# Patient Record
Sex: MALE | Race: WHITE | Employment: OTHER | URBAN - METROPOLITAN AREA
[De-identification: names, ages, dates, MRNs, and addresses within clinical notes are randomized per-mention and may not be internally consistent; named-entity substitution may affect disease eponyms.]

---

## 2017-02-15 ENCOUNTER — OFFICE VISIT (OUTPATIENT)
Dept: CARDIOLOGY CLINIC | Age: 77
End: 2017-02-15

## 2017-02-15 VITALS
WEIGHT: 185.12 LBS | DIASTOLIC BLOOD PRESSURE: 66 MMHG | RESPIRATION RATE: 16 BRPM | HEIGHT: 68 IN | SYSTOLIC BLOOD PRESSURE: 124 MMHG | BODY MASS INDEX: 28.06 KG/M2 | OXYGEN SATURATION: 93 % | HEART RATE: 72 BPM

## 2017-02-15 DIAGNOSIS — E78.2 MIXED HYPERLIPIDEMIA: Chronic | ICD-10-CM

## 2017-02-15 DIAGNOSIS — I10 ESSENTIAL HYPERTENSION: Chronic | ICD-10-CM

## 2017-02-15 DIAGNOSIS — Z95.5 STATUS POST INSERTION OF DRUG-ELUTING STENT INTO RIGHT CORONARY ARTERY FOR CORONARY ARTERY DISEASE: Chronic | ICD-10-CM

## 2017-02-15 DIAGNOSIS — I25.10 CORONARY ARTERY DISEASE DUE TO LIPID RICH PLAQUE: Primary | ICD-10-CM

## 2017-02-15 DIAGNOSIS — I25.83 CORONARY ARTERY DISEASE DUE TO LIPID RICH PLAQUE: Primary | ICD-10-CM

## 2017-02-15 PROCEDURE — 99214 OFFICE O/P EST MOD 30 MIN: CPT | Performed by: INTERNAL MEDICINE

## 2017-03-07 ENCOUNTER — TELEPHONE (OUTPATIENT)
Dept: FAMILY MEDICINE CLINIC | Age: 77
End: 2017-03-07

## 2017-03-21 ENCOUNTER — TELEPHONE (OUTPATIENT)
Dept: CARDIOLOGY CLINIC | Age: 77
End: 2017-03-21

## 2017-03-21 RX ORDER — ATORVASTATIN CALCIUM 80 MG/1
80 TABLET, FILM COATED ORAL NIGHTLY
Qty: 90 TABLET | Refills: 3 | Status: SHIPPED | OUTPATIENT
Start: 2017-03-21 | End: 2018-02-12 | Stop reason: SDUPTHER

## 2017-05-23 ENCOUNTER — TELEPHONE (OUTPATIENT)
Dept: CARDIOLOGY CLINIC | Age: 77
End: 2017-05-23

## 2017-05-26 RX ORDER — CLOPIDOGREL BISULFATE 75 MG/1
TABLET ORAL
Qty: 90 TABLET | Refills: 0 | Status: SHIPPED | OUTPATIENT
Start: 2017-05-26 | End: 2017-07-31 | Stop reason: SDUPTHER

## 2017-08-02 RX ORDER — CLOPIDOGREL BISULFATE 75 MG/1
TABLET ORAL
Qty: 90 TABLET | Refills: 2 | Status: SHIPPED | OUTPATIENT
Start: 2017-08-02 | End: 2018-02-12 | Stop reason: SDUPTHER

## 2017-09-28 RX ORDER — LISINOPRIL 10 MG/1
TABLET ORAL
Qty: 90 TABLET | Refills: 3 | Status: SHIPPED | OUTPATIENT
Start: 2017-09-28 | End: 2018-06-26 | Stop reason: SDUPTHER

## 2018-02-13 RX ORDER — CLOPIDOGREL BISULFATE 75 MG/1
TABLET ORAL
Qty: 90 TABLET | Refills: 2 | Status: SHIPPED | OUTPATIENT
Start: 2018-02-13 | End: 2018-08-22 | Stop reason: SDUPTHER

## 2018-02-13 RX ORDER — ATORVASTATIN CALCIUM 80 MG/1
TABLET, FILM COATED ORAL
Qty: 90 TABLET | Refills: 3 | Status: SHIPPED | OUTPATIENT
Start: 2018-02-13 | End: 2018-08-22 | Stop reason: SDUPTHER

## 2018-02-13 NOTE — PROGRESS NOTES
[address]   Cardiac Followup    Referring Provider:  Prosper Orantes MD     Chief Complaint   Patient presents with    Coronary Artery Disease        History of Present Illness: This 69 y.o. female is today for follow up on his history of CAD. He has a history of CAD s/p PCI with RHETT with RHETT of RCA. He is treated for HTN and hyperlipidemia. Today, he denies exertional chest pain but reports getting fatigued and mildly shortness of breath when walking longer distances. He has low energy overall. He is able to walk 1/4 of mile before he needs to stop and rest.  He exercises, not hard or to point of heavy exertion, and tolerates this well. He also bench presses 80 lb every other day. 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 He does not exercise as much as he should due to work. He forgets to take statin in the evening at times. Skin is very dry with bumps on his legs, arms, and back. Wife is present for the visit. Past Medical History:   has a past medical history of Actinic keratosis; Allergic rhinitis, cause unspecified; Degeneration of lumbar or lumbosacral intervertebral disc; Other abnormal blood chemistry; Routine general medical examination at a health care facility; and Unspecified essential hypertension. Surgical History:   has a past surgical history that includes Appendectomy and other surgical history (12/98). Social History:   reports that he has never smoked. He has never used smokeless tobacco. He reports that he does not drink alcohol or use drugs. Family History:  family history includes Heart Disease in an other family member.      Home Medications:    Current Outpatient Prescriptions:     nitroGLYCERIN (NITROSTAT) 0.4 MG SL tablet, Place 1 tablet under the tongue every 5 minutes as needed for Chest pain, Disp: 25 tablet, Rfl: 3    atorvastatin (LIPITOR) 80 MG tablet, TAKE 1 TABLET NIGHTLY, Disp: 90 tablet, Rfl: 3    clopidogrel (PLAVIX) 75 MG tablet, TAKE 1 TABLET EVERY DAY, Disp: 90 tablet, Rfl: 2    lisinopril (PRINIVIL;ZESTRIL) 10 MG tablet, TAKE 1 TABLET BY MOUTH DAILY, Disp: 90 tablet, Rfl: 3    aspirin EC 81 MG EC tablet, Take 1 tablet by mouth daily, Disp: 30 tablet, Rfl: 11    Loratadine 10 MG CAPS, Take 10 mg by mouth, Disp: , Rfl:        Allergies:  Review of patient's allergies indicates no known allergies. Review of Systems:   A 10 point review of systems is negative except as noted in the history of present illness. Physical Examination:    Vitals:    02/14/18 0935   BP: 130/70   Pulse: 80        Constitutional and General Appearance: NAD   Respiratory:  · Normal excursion and expansion without use of accessory muscles  · Resp Auscultation: Normal breath sounds without dullness  Cardiovascular:  · The apical impulses not displaced  · JVD is normal  · The carotid upstroke is normal in amplitude and contour without delay or bruit  · Normal S1S2, No S3, No Murmur. Regular rate and rhythm  · Peripheral pulses are symmetrical and full  · There is no clubbing, cyanosis of the extremities. · No edema  · Pedal Pulses: 2+ and equal   Abdomen:  · No masses or tenderness  · Liver/Spleen: No Abnormalities Noted  Neurological/Psychiatric:  · Alert and oriented in all spheres  · Moves all extremities well  · Exhibits normal gait balance and coordination  · No abnormalities of mood, affect, memory, mentation, or behavior are noted    Myoview stress test 8/17/16:  Summary       No EKG evidence for ischemia with exercise       Preserved LV systolic function.   LVEF 65%       Myocardial perfusion imaging with no evidence for ischemia with exercise       Excessive motion noted with imaging which may impact diagnostic accuracy.           Cardiac Cath 5/18/16:  Anatomy:   LM-normal  LAD-50% mid, 70% distal at the apex  D1-50% ostial, 70% proximal  Cx-99% mid with left to left collaterals  RCA-70% mid long, 99% distal hazy  RPDA- medication  2. Take Lipitor in am daily to improve compliance  3. Lipids, liver enzymes, BNP, and CBC soon  4. See dermatologist for dry skin  5. RTC in six months    Thank you for allowing me to participate in the care of this individual.    Scribe's attestation: This note was scribed in the presence of Eddie Chapman M.D. by Vishnu Thornton RN    Physician Attestation: The scribe's documentation has been prepared under my direction and personally reviewed by me in its entirety. I confirm that the note above accurately reflects all work, treatment, procedures, and medical decision making performed by me. Nilda Parra M.D., [facility] [place]

## 2018-02-14 ENCOUNTER — OFFICE VISIT (OUTPATIENT)
Dept: CARDIOLOGY CLINIC | Age: 78
End: 2018-02-14

## 2018-02-14 VITALS — SYSTOLIC BLOOD PRESSURE: 130 MMHG | DIASTOLIC BLOOD PRESSURE: 70 MMHG | HEART RATE: 80 BPM

## 2018-02-14 DIAGNOSIS — I10 ESSENTIAL HYPERTENSION: Chronic | ICD-10-CM

## 2018-02-14 DIAGNOSIS — Z95.5 S/P DRUG ELUTING CORONARY STENT PLACEMENT: Chronic | ICD-10-CM

## 2018-02-14 DIAGNOSIS — I25.83 CORONARY ARTERY DISEASE DUE TO LIPID RICH PLAQUE: ICD-10-CM

## 2018-02-14 DIAGNOSIS — E78.2 MIXED HYPERLIPIDEMIA: Primary | Chronic | ICD-10-CM

## 2018-02-14 DIAGNOSIS — I25.10 CORONARY ARTERY DISEASE DUE TO LIPID RICH PLAQUE: ICD-10-CM

## 2018-02-14 PROCEDURE — 1036F TOBACCO NON-USER: CPT | Performed by: INTERNAL MEDICINE

## 2018-02-14 PROCEDURE — G8427 DOCREV CUR MEDS BY ELIG CLIN: HCPCS | Performed by: INTERNAL MEDICINE

## 2018-02-14 PROCEDURE — 4040F PNEUMOC VAC/ADMIN/RCVD: CPT | Performed by: INTERNAL MEDICINE

## 2018-02-14 PROCEDURE — 1123F ACP DISCUSS/DSCN MKR DOCD: CPT | Performed by: INTERNAL MEDICINE

## 2018-02-14 PROCEDURE — G8484 FLU IMMUNIZE NO ADMIN: HCPCS | Performed by: INTERNAL MEDICINE

## 2018-02-14 PROCEDURE — 99214 OFFICE O/P EST MOD 30 MIN: CPT | Performed by: INTERNAL MEDICINE

## 2018-02-14 PROCEDURE — G8598 ASA/ANTIPLAT THER USED: HCPCS | Performed by: INTERNAL MEDICINE

## 2018-02-14 PROCEDURE — G8421 BMI NOT CALCULATED: HCPCS | Performed by: INTERNAL MEDICINE

## 2018-02-14 RX ORDER — NITROGLYCERIN 0.4 MG/1
0.4 TABLET SUBLINGUAL EVERY 5 MIN PRN
Qty: 25 TABLET | Refills: 3 | Status: SHIPPED | OUTPATIENT
Start: 2018-02-14 | End: 2018-08-22 | Stop reason: SDUPTHER

## 2018-02-21 ENCOUNTER — HOSPITAL ENCOUNTER (OUTPATIENT)
Dept: OTHER | Age: 78
Discharge: HOME | End: 2018-02-21
Attending: INTERNAL MEDICINE | Admitting: INTERNAL MEDICINE

## 2018-02-21 ENCOUNTER — TELEPHONE (OUTPATIENT)
Dept: CARDIOLOGY CLINIC | Age: 78
End: 2018-02-21

## 2018-02-21 DIAGNOSIS — E78.2 MIXED HYPERLIPIDEMIA: Chronic | ICD-10-CM

## 2018-02-21 LAB
ALT SERPL-CCNC: 22 U/L (ref 10–40)
ANION GAP SERPL CALCULATED.3IONS-SCNC: 8 MMOL/L (ref 3–16)
AST SERPL-CCNC: 26 U/L (ref 15–37)
BUN BLDV-MCNC: 23 MG/DL (ref 7–20)
CALCIUM SERPL-MCNC: 9.2 MG/DL (ref 8.3–10.6)
CHLORIDE BLD-SCNC: 104 MMOL/L (ref 99–110)
CHOLESTEROL, TOTAL: 137 MG/DL (ref 0–199)
CO2: 30 MMOL/L (ref 21–32)
CREAT SERPL-MCNC: 1.2 MG/DL (ref 0.8–1.3)
GFR AFRICAN AMERICAN: >60
GFR NON-AFRICAN AMERICAN: 59
GLUCOSE BLD-MCNC: 112 MG/DL (ref 70–99)
HCT VFR BLD CALC: 47.8 % (ref 40.5–52.5)
HDLC SERPL-MCNC: 53 MG/DL (ref 40–60)
HEMOGLOBIN: 16 G/DL (ref 13.5–17.5)
LDL CHOLESTEROL CALCULATED: 71 MG/DL
MCH RBC QN AUTO: 32 PG (ref 26–34)
MCHC RBC AUTO-ENTMCNC: 33.6 G/DL (ref 31–36)
MCV RBC AUTO: 95.3 FL (ref 80–100)
PDW BLD-RTO: 14.3 % (ref 12.4–15.4)
PLATELET # BLD: 323 K/UL (ref 135–450)
PMV BLD AUTO: 7.6 FL (ref 5–10.5)
POTASSIUM SERPL-SCNC: 4.8 MMOL/L (ref 3.5–5.1)
RBC # BLD: 5.01 M/UL (ref 4.2–5.9)
SODIUM BLD-SCNC: 142 MMOL/L (ref 136–145)
TRIGL SERPL-MCNC: 67 MG/DL (ref 0–150)
VLDLC SERPL CALC-MCNC: 13 MG/DL
WBC # BLD: 7.1 K/UL (ref 4–11)

## 2018-02-21 NOTE — TELEPHONE ENCOUNTER
Jagdish Paez MD sent to [facility]             Glucose mildly elevated.  Otherwise labs look good. Jagdish Paez MD sent to [facility]             Labs look good     I called pt and gave above messages. He gave a verbal understanding.

## 2018-04-11 ENCOUNTER — OFFICE VISIT (OUTPATIENT)
Dept: FAMILY MEDICINE CLINIC | Age: 78
End: 2018-04-11

## 2018-04-11 ENCOUNTER — TELEPHONE (OUTPATIENT)
Dept: FAMILY MEDICINE CLINIC | Age: 78
End: 2018-04-11

## 2018-04-11 VITALS
SYSTOLIC BLOOD PRESSURE: 110 MMHG | WEIGHT: 188 LBS | HEART RATE: 74 BPM | HEIGHT: 67 IN | BODY MASS INDEX: 29.51 KG/M2 | DIASTOLIC BLOOD PRESSURE: 64 MMHG | OXYGEN SATURATION: 97 %

## 2018-04-11 DIAGNOSIS — Z00.00 ROUTINE GENERAL MEDICAL EXAMINATION AT A HEALTH CARE FACILITY: Primary | ICD-10-CM

## 2018-04-11 PROCEDURE — G0439 PPPS, SUBSEQ VISIT: HCPCS | Performed by: FAMILY MEDICINE

## 2018-04-11 PROCEDURE — G0009 ADMIN PNEUMOCOCCAL VACCINE: HCPCS | Performed by: FAMILY MEDICINE

## 2018-04-11 PROCEDURE — 4040F PNEUMOC VAC/ADMIN/RCVD: CPT | Performed by: FAMILY MEDICINE

## 2018-04-11 PROCEDURE — 90670 PCV13 VACCINE IM: CPT | Performed by: FAMILY MEDICINE

## 2018-04-11 PROCEDURE — G8598 ASA/ANTIPLAT THER USED: HCPCS | Performed by: FAMILY MEDICINE

## 2018-04-11 ASSESSMENT — LIFESTYLE VARIABLES
HOW MANY STANDARD DRINKS CONTAINING ALCOHOL DO YOU HAVE ON A TYPICAL DAY: 0
HOW OFTEN DO YOU HAVE SIX OR MORE DRINKS ON ONE OCCASION: 0
AUDIT-C TOTAL SCORE: 1
HAVE YOU OR SOMEONE ELSE BEEN INJURED AS A RESULT OF YOUR DRINKING: 0
HAS A RELATIVE, FRIEND, DOCTOR, OR ANOTHER HEALTH PROFESSIONAL EXPRESSED CONCERN ABOUT YOUR DRINKING OR SUGGESTED YOU CUT DOWN: 0
HOW OFTEN DURING THE LAST YEAR HAVE YOU NEEDED AN ALCOHOLIC DRINK FIRST THING IN THE MORNING TO GET YOURSELF GOING AFTER A NIGHT OF HEAVY DRINKING: 0
AUDIT TOTAL SCORE: 1
HOW OFTEN DURING THE LAST YEAR HAVE YOU FAILED TO DO WHAT WAS NORMALLY EXPECTED FROM YOU BECAUSE OF DRINKING: 0
HOW OFTEN DURING THE LAST YEAR HAVE YOU HAD A FEELING OF GUILT OR REMORSE AFTER DRINKING: 0
HOW OFTEN DURING THE LAST YEAR HAVE YOU FOUND THAT YOU WERE NOT ABLE TO STOP DRINKING ONCE YOU HAD STARTED: 0
HOW OFTEN DURING THE LAST YEAR HAVE YOU BEEN UNABLE TO REMEMBER WHAT HAPPENED THE NIGHT BEFORE BECAUSE YOU HAD BEEN DRINKING: 0
HOW OFTEN DO YOU HAVE A DRINK CONTAINING ALCOHOL: 1

## 2018-04-11 ASSESSMENT — PATIENT HEALTH QUESTIONNAIRE - PHQ9: SUM OF ALL RESPONSES TO PHQ QUESTIONS 1-9: 0

## 2018-04-11 ASSESSMENT — ANXIETY QUESTIONNAIRES: GAD7 TOTAL SCORE: 0

## 2018-06-26 ENCOUNTER — TELEPHONE (OUTPATIENT)
Dept: ADMINISTRATIVE | Age: 78
End: 2018-06-26

## 2018-06-26 RX ORDER — LISINOPRIL 10 MG/1
TABLET ORAL
Qty: 90 TABLET | Refills: 3 | Status: SHIPPED | OUTPATIENT
Start: 2018-06-26 | End: 2018-08-22 | Stop reason: SDUPTHER

## 2018-06-27 ENCOUNTER — OFFICE VISIT (OUTPATIENT)
Dept: FAMILY MEDICINE CLINIC | Age: 78
End: 2018-06-27

## 2018-06-27 VITALS
HEART RATE: 64 BPM | SYSTOLIC BLOOD PRESSURE: 140 MMHG | BODY MASS INDEX: 29.51 KG/M2 | DIASTOLIC BLOOD PRESSURE: 70 MMHG | WEIGHT: 187 LBS | OXYGEN SATURATION: 96 %

## 2018-06-27 DIAGNOSIS — D49.2 SKIN NEOPLASM: Primary | ICD-10-CM

## 2018-06-27 PROCEDURE — 11100 PR BIOPSY OF SKIN LESION: CPT | Performed by: FAMILY MEDICINE

## 2018-07-03 DIAGNOSIS — C44.519 BASAL CELL CARCINOMA OF UPPER BACK: Primary | ICD-10-CM

## 2018-08-03 ENCOUNTER — OFFICE VISIT (OUTPATIENT)
Dept: DERMATOLOGY | Age: 78
End: 2018-08-03

## 2018-08-03 DIAGNOSIS — C44.519 BCC (BASAL CELL CARCINOMA), TRUNK: Primary | ICD-10-CM

## 2018-08-03 PROCEDURE — 12032 INTMD RPR S/A/T/EXT 2.6-7.5: CPT | Performed by: DERMATOLOGY

## 2018-08-03 PROCEDURE — 11603 EXC TR-EXT MAL+MARG 2.1-3 CM: CPT | Performed by: DERMATOLOGY

## 2018-08-03 NOTE — PROGRESS NOTES
potential risks of bleeding, discomfort, scar, and recurrence. A consent form was signed by the patient. The area was prepped and draped in the normal sterile fashion. Local anesthesia was achieved with 1% lidocaine with epinephrine. An elliptical excision was performed (width including margins = 2.5 cm) down to subcutaneous tissue. The specimen was submitted to pathology in an appropriately labeled specimen bottle. Pinpoint hemostasis was obtained. The wound edges were undermined and approximated in a layered fashion with buried sutures of 3-0 PDS allowing for wound eversion and Dermabond to approximate the superficial dermis and epidermis. The final length of the wound measured 7 cm. Blood loss was minimal and there were no immediate complications. The wound was dressed with a pressure bandage.

## 2018-08-08 ENCOUNTER — TELEPHONE (OUTPATIENT)
Dept: DERMATOLOGY | Age: 78
End: 2018-08-08

## 2018-08-08 LAB — DERMATOLOGY PATHOLOGY REPORT: ABNORMAL

## 2018-08-21 NOTE — PROGRESS NOTES
essential hypertension        Past Surgical History:   Procedure Laterality Date    APPENDECTOMY      OTHER SURGICAL HISTORY  12/98    basal cell CA-; back-removed 12/98        Social History   Substance Use Topics    Smoking status: Never Smoker    Smokeless tobacco: Never Used    Alcohol use No        Family History   Problem Relation Age of Onset    Heart Disease Other        PHYSICAL EXAMINATION:  Vitals:    08/22/18 1257 08/22/18 1302   BP: (!) 144/80 138/80   Site: Left Arm Left Arm   Position: Sitting Sitting   Cuff Size: Medium Adult Medium Adult   Pulse: 72    Weight: 187 lb 6.4 oz (85 kg)    Height: 5' 7.5\" (1.715 m)      Estimated body mass index is 28.92 kg/m² as calculated from the following:    Height as of this encounter: 5' 7.5\" (1.715 m). Weight as of this encounter: 187 lb 6.4 oz (85 kg). General Appearance: No apparent distress  Eyes:  · Conjunctiva clear  · Pupils equal, round, reactive to light  ENT:  · External Ears and Nose unremarkable  · Oral mucosa is moist  Respiratory:  · Normal excursion and expansion without use of accessory muscles  · Resp Auscultation: Normal breath sounds without dullness  Cardiovascular:  · JVD is normal  · The carotid upstroke is normal in amplitude and contour without delay or bruit  · Apical impulse is not displaced  · Normal S1, S2. No S3. No Murmur. Regular rate and rhythm  · No edema  · Pedal Pulses: 2+ and equal   Abdomen:  · No masses or tenderness  · Liver/Spleen: No Abnormalities Noted  Musculoskeletal:  · Fingers without clubbing or cyanosis  · Normal Gait  Skin:  · No rash. Incision with ecchymosis post skin cancer removal on left upper back   · Normal skin turgor   Neurologic/Psychiatric:  · Alert and oriented in all spheres  · Normal mood and affect  · Memory and mentation intact      Myoview stress test 8/17/16:  Summary       No EKG evidence for ischemia with exercise       Preserved LV systolic function.   LVEF 65%       Myocardial perfusion imaging with no evidence for ischemia with exercise       Excessive motion noted with imaging which may impact diagnostic accuracy.             Cardiac Cath 5/18/16:  Anatomy:   LM-normal  LAD-50% mid, 70% distal at the apex  D1-50% ostial, 70% proximal  Cx-99% mid with left to left collaterals  RCA-70% mid long, 99% distal hazy  RPDA- patent  LVEF- 50 % with inferior hypokinesis, no significant mitral regurgitation  PCI: Circumflex 99% to 0% mid with a 2.25 mm x 26 mm Medtronic resolute drug-eluting stent postdilated with a 2.5 mm noncompliant balloon. RCA 70% to 0% mid with 2.5 mm x 30 mm Medtronic resolute eluding stent and a 2.5 mm x 22 mm Medtronic resolute drug-eluting stent. The distal RCA is 99% to 0% with a 2.25 mm x 22 mm Medtronic resolute drug-eluting stent.     Impression:  1. Severe multivessel coronary artery disease  2. Successful drug-eluting stent implantation in the RCA ×3 and a circumflex ×1  3. Borderline severe CAD of the distal LAD at the apex in the proximal diagonal  4. Preserved LV systolic function     Plan:  1. Effient/equivalent for minimum of 1 year  2. Aspirin indefinitely  3. No beta blocker due to bradycardia  4. Attempted medical therapy of the distal LAD and diagonal. Cardiac risk stratification with exercise Myoview stress testing and approximately 3-6 months.         ASSESSMENT/PLAN:  1. Coronary artery disease due to lipid rich plaque  2. Status post insertion of drug-eluting stent into right coronary artery for coronary artery disease (5/18/16)  3. S/P drug eluting coronary stent placement into left circumflex artery (5/18/16)  ~ 5/2016 [facility] showed borderline distal LAD and diagonal disease. 8/2016 Myoview GXT showed no evidence of ischemia with exercise. Normal EF  ~ treated with ASA, Plavix, statin,   ~ reports fatigue during the day and sleeps at 2-3 hour intervals before waking up. Does have shortness of breath when walking longer distances.     ~ recommend

## 2018-08-22 ENCOUNTER — HOSPITAL ENCOUNTER (OUTPATIENT)
Dept: OTHER | Age: 78
Discharge: HOME | End: 2018-08-22
Attending: INTERNAL MEDICINE | Admitting: INTERNAL MEDICINE

## 2018-08-22 ENCOUNTER — OFFICE VISIT (OUTPATIENT)
Dept: CARDIOLOGY CLINIC | Age: 78
End: 2018-08-22

## 2018-08-22 VITALS
SYSTOLIC BLOOD PRESSURE: 138 MMHG | HEART RATE: 72 BPM | HEIGHT: 68 IN | BODY MASS INDEX: 28.4 KG/M2 | DIASTOLIC BLOOD PRESSURE: 80 MMHG | WEIGHT: 187.4 LBS

## 2018-08-22 DIAGNOSIS — R06.02 EXERTIONAL SHORTNESS OF BREATH: ICD-10-CM

## 2018-08-22 DIAGNOSIS — E78.5 DYSLIPIDEMIA: ICD-10-CM

## 2018-08-22 DIAGNOSIS — Z95.5 S/P DRUG ELUTING CORONARY STENT PLACEMENT: Chronic | ICD-10-CM

## 2018-08-22 DIAGNOSIS — I25.10 CORONARY ARTERY DISEASE DUE TO LIPID RICH PLAQUE: Primary | ICD-10-CM

## 2018-08-22 DIAGNOSIS — R53.82 CHRONIC FATIGUE: ICD-10-CM

## 2018-08-22 DIAGNOSIS — R53.83 FATIGUE, UNSPECIFIED TYPE: ICD-10-CM

## 2018-08-22 DIAGNOSIS — I10 ESSENTIAL HYPERTENSION: Chronic | ICD-10-CM

## 2018-08-22 DIAGNOSIS — I25.83 CORONARY ARTERY DISEASE DUE TO LIPID RICH PLAQUE: Primary | ICD-10-CM

## 2018-08-22 DIAGNOSIS — Z95.5 STATUS POST INSERTION OF DRUG-ELUTING STENT INTO RIGHT CORONARY ARTERY FOR CORONARY ARTERY DISEASE: Chronic | ICD-10-CM

## 2018-08-22 LAB
T3 FREE: 3.2 PG/ML (ref 2.3–4.2)
T4 FREE: 1.4 NG/DL (ref 0.9–1.8)
TSH REFLEX: 2.32 UIU/ML (ref 0.27–4.2)

## 2018-08-22 PROCEDURE — 1123F ACP DISCUSS/DSCN MKR DOCD: CPT | Performed by: INTERNAL MEDICINE

## 2018-08-22 PROCEDURE — 1101F PT FALLS ASSESS-DOCD LE1/YR: CPT | Performed by: INTERNAL MEDICINE

## 2018-08-22 PROCEDURE — G8419 CALC BMI OUT NRM PARAM NOF/U: HCPCS | Performed by: INTERNAL MEDICINE

## 2018-08-22 PROCEDURE — 1036F TOBACCO NON-USER: CPT | Performed by: INTERNAL MEDICINE

## 2018-08-22 PROCEDURE — 4040F PNEUMOC VAC/ADMIN/RCVD: CPT | Performed by: INTERNAL MEDICINE

## 2018-08-22 PROCEDURE — 99214 OFFICE O/P EST MOD 30 MIN: CPT | Performed by: INTERNAL MEDICINE

## 2018-08-22 PROCEDURE — G8598 ASA/ANTIPLAT THER USED: HCPCS | Performed by: INTERNAL MEDICINE

## 2018-08-22 PROCEDURE — G8427 DOCREV CUR MEDS BY ELIG CLIN: HCPCS | Performed by: INTERNAL MEDICINE

## 2018-08-22 RX ORDER — LISINOPRIL 10 MG/1
10 TABLET ORAL DAILY
Qty: 90 TABLET | Refills: 3 | Status: SHIPPED | OUTPATIENT
Start: 2018-08-22 | End: 2019-06-12 | Stop reason: SDUPTHER

## 2018-08-22 RX ORDER — NITROGLYCERIN 0.4 MG/1
0.4 TABLET SUBLINGUAL EVERY 5 MIN PRN
Qty: 25 TABLET | Refills: 3 | Status: SHIPPED | OUTPATIENT
Start: 2018-08-22 | End: 2018-11-28 | Stop reason: SDUPTHER

## 2018-08-22 RX ORDER — ATORVASTATIN CALCIUM 80 MG/1
80 TABLET, FILM COATED ORAL DAILY
Qty: 90 TABLET | Refills: 3 | Status: SHIPPED | OUTPATIENT
Start: 2018-08-22 | End: 2019-07-15 | Stop reason: SDUPTHER

## 2018-08-22 RX ORDER — CLOPIDOGREL BISULFATE 75 MG/1
75 TABLET ORAL DAILY
Qty: 90 TABLET | Refills: 3 | Status: SHIPPED | OUTPATIENT
Start: 2018-08-22 | End: 2019-06-17 | Stop reason: SDUPTHER

## 2018-08-23 ENCOUNTER — TELEPHONE (OUTPATIENT)
Dept: CARDIOLOGY CLINIC | Age: 78
End: 2018-08-23

## 2018-08-23 NOTE — TELEPHONE ENCOUNTER
TSH with Reflex   Order: [id number]   Status:  Final result   Visible to patient:  No (Not Released)   Dx: Fatigue, unspecified type   Notes recorded by Phani Almodovar MD on 8/22/2018 at 6:14 PM EDT  Results normal     Notified patient of lab results.

## 2018-09-05 ENCOUNTER — HOSPITAL ENCOUNTER (OUTPATIENT)
Dept: NON INVASIVE DIAGNOSTICS | Age: 78
Discharge: HOME | End: 2018-09-05
Attending: INTERNAL MEDICINE | Admitting: INTERNAL MEDICINE

## 2018-09-05 DIAGNOSIS — R06.02 SHORTNESS OF BREATH: ICD-10-CM

## 2018-09-05 LAB
LV EF: 71 %
LVEF MODALITY: NORMAL

## 2018-09-05 NOTE — PROGRESS NOTES
Patient instructed on Ronaldo Protocol Stress Test Procedure including possible side effects and adverse reactions. Verbalizes knowledge and understanding and denies having any questions.   Juanis Muniz RN

## 2018-09-06 ENCOUNTER — TELEPHONE (OUTPATIENT)
Dept: CARDIOLOGY CLINIC | Age: 78
End: 2018-09-06

## 2018-09-06 NOTE — TELEPHONE ENCOUNTER
Spoke to patient about results of MPI done 9/5/18 (normal results). Referral was sent to Dr. Zuleika Nicholson office for sleep evaluation, but patient states he is not interested in pursuing this. Patient verbalized understanding of stress test results.

## 2018-11-28 DIAGNOSIS — I25.83 CORONARY ARTERY DISEASE DUE TO LIPID RICH PLAQUE: ICD-10-CM

## 2018-11-28 DIAGNOSIS — I25.10 CORONARY ARTERY DISEASE DUE TO LIPID RICH PLAQUE: ICD-10-CM

## 2018-11-28 RX ORDER — NITROGLYCERIN 0.4 MG/1
0.4 TABLET SUBLINGUAL EVERY 5 MIN PRN
Qty: 25 TABLET | Refills: 3 | Status: SHIPPED | OUTPATIENT
Start: 2018-11-28

## 2019-02-13 ENCOUNTER — OFFICE VISIT (OUTPATIENT)
Dept: FAMILY MEDICINE CLINIC | Age: 79
End: 2019-02-13
Payer: MEDICARE

## 2019-02-13 VITALS
WEIGHT: 186 LBS | SYSTOLIC BLOOD PRESSURE: 136 MMHG | OXYGEN SATURATION: 96 % | DIASTOLIC BLOOD PRESSURE: 78 MMHG | BODY MASS INDEX: 28.7 KG/M2 | HEART RATE: 63 BPM

## 2019-02-13 DIAGNOSIS — B35.1 ONYCHOMYCOSIS: ICD-10-CM

## 2019-02-13 DIAGNOSIS — M72.2 PLANTAR FASCIITIS OF RIGHT FOOT: Primary | ICD-10-CM

## 2019-02-13 DIAGNOSIS — R41.3 MEMORY DEFICIT: ICD-10-CM

## 2019-02-13 DIAGNOSIS — I10 ESSENTIAL HYPERTENSION: Chronic | ICD-10-CM

## 2019-02-13 PROCEDURE — 1036F TOBACCO NON-USER: CPT | Performed by: FAMILY MEDICINE

## 2019-02-13 PROCEDURE — 1123F ACP DISCUSS/DSCN MKR DOCD: CPT | Performed by: FAMILY MEDICINE

## 2019-02-13 PROCEDURE — 99214 OFFICE O/P EST MOD 30 MIN: CPT | Performed by: FAMILY MEDICINE

## 2019-02-13 PROCEDURE — 1101F PT FALLS ASSESS-DOCD LE1/YR: CPT | Performed by: FAMILY MEDICINE

## 2019-02-13 PROCEDURE — G8427 DOCREV CUR MEDS BY ELIG CLIN: HCPCS | Performed by: FAMILY MEDICINE

## 2019-02-13 PROCEDURE — G8598 ASA/ANTIPLAT THER USED: HCPCS | Performed by: FAMILY MEDICINE

## 2019-02-13 PROCEDURE — G8419 CALC BMI OUT NRM PARAM NOF/U: HCPCS | Performed by: FAMILY MEDICINE

## 2019-02-13 PROCEDURE — 4040F PNEUMOC VAC/ADMIN/RCVD: CPT | Performed by: FAMILY MEDICINE

## 2019-02-13 PROCEDURE — G8484 FLU IMMUNIZE NO ADMIN: HCPCS | Performed by: FAMILY MEDICINE

## 2019-02-27 LAB
A/G RATIO: 0.9 (ref 1–2)
ALBUMIN SERPL-MCNC: 7.3 G/DL (ref 6.4–8.2)
ALBUMIN SERUM: 3.4 G/DL (ref 3.4–5)
ALP BLD-CCNC: 103 U/L (ref 45–117)
ALT SERPL-CCNC: 35 U/L (ref 12–78)
ANION GAP SERPL CALCULATED.3IONS-SCNC: 6 MMOL/L (ref 7–16)
AST SERPL-CCNC: 27 U/L (ref 15–37)
BILIRUBIN: 0.7 MG/DL (ref 0.2–1)
BUN BLDV-MCNC: 22 MG/DL (ref 7–18)
CALCIUM SERPL-MCNC: 8.6 MG/DL (ref 8.5–10.1)
CHLORIDE BLD-SCNC: 108 MMOL/L (ref 98–107)
CHOLESTEROL, STONE: 122 MG/DL
CO2: 28 MMOL/L (ref 21–32)
CREATININE + EGFR PANEL: 1.3 MG/DL (ref 0.6–1.3)
FOLATE: 18.4 NG/ML (ref 3.1–17.5)
GFR AFRICAN AMERICAN: > 60 ML/MIN/1.73M2
GFR NON-AFRICAN AMERICAN: 54 ML/MIN/1.73M2
GLOBULIN: 3.9 G/DL (ref 2.6–4.2)
GLUCOSE: 98 MG/DL (ref 74–106)
HCT VFR BLD CALC: 48 % (ref 39–51.5)
HDLC SERPL-MCNC: 50 MG/DL (ref 40–60)
HEMOGLOBIN: 15.6 G/DL (ref 13.1–17.6)
LDL CHOLESTEROL CALCULATED: 58 MG/DL (ref 0–99)
MCH RBC QN AUTO: 30.9 PG (ref 28.4–33.4)
MCHC RBC AUTO-ENTMCNC: 32.4 G/DL (ref 31.1–37)
MCV RBC AUTO: 95.3 FL (ref 85–99)
PDW BLD-RTO: 14.5 % (ref 11.7–15.2)
PLATELET # BLD: 301 K/UL (ref 154–393)
POTASSIUM SERPL-SCNC: 4 MMOL/L (ref 3.5–5.1)
RBC # BLD: 5.04 M/UL (ref 4.3–5.86)
SODIUM BLD-SCNC: 142 MMOL/L (ref 136–145)
TRIGL SERPL-MCNC: 69 MG/DL (ref 0–149)
TSH SERPL DL<=0.05 MIU/L-ACNC: 2.1 UIU/ML (ref 0.36–3.74)
VITAMIN B-12: 431 PG/ML (ref 193–986)
VLDLC SERPL CALC-MCNC: 14 MG/DL (ref 0–40)
WBC # BLD: 6.8 K/UL (ref 4–10.5)

## 2019-04-21 NOTE — PROGRESS NOTES
 Other abnormal blood chemistry     Routine general medical examination at a health care facility     Unspecified essential hypertension        Past Surgical History:   Procedure Laterality Date    APPENDECTOMY      OTHER SURGICAL HISTORY  12/98    basal cell CA-; back-removed 12/98        Social History     Tobacco Use    Smoking status: Never Smoker    Smokeless tobacco: Never Used   Substance Use Topics    Alcohol use: No     Alcohol/week: 0.6 oz     Types: 1 Cans of beer per week        Family History   Problem Relation Age of Onset    Heart Disease Other        PHYSICAL EXAMINATION:  Vitals:    04/24/19 0916   BP: 130/72   Site: Left Upper Arm   Position: Sitting   Cuff Size: Large Adult   Pulse: 66   SpO2: 93%   Weight: 188 lb 1.9 oz (85.3 kg)   Height: 5' 7\" (1.702 m)     Estimated body mass index is 29.46 kg/m² as calculated from the following:    Height as of this encounter: 5' 7\" (1.702 m). Weight as of this encounter: 188 lb 1.9 oz (85.3 kg). General Appearance: No apparent distress  Eyes:  · Conjunctiva clear  · Pupils equal, round, reactive to light  ENT:  · External Ears and Nose unremarkable  · Oral mucosa is moist  Respiratory:  · Normal excursion and expansion without use of accessory muscles  · Resp Auscultation: Normal breath sounds without dullness  Cardiovascular:  · JVD is normal  · The carotid upstroke is normal in amplitude and contour without delay or bruit  · Apical impulse is not displaced. · Normal S1, S2. No S3. No Murmur. Regular rate and rhythm  · No edema. · Pedal Pulses: 2+ and equal.   Abdomen:  · No masses or tenderness  · Liver/Spleen: No Abnormalities Noted  Musculoskeletal:  · Fingers without clubbing or cyanosis  · Normal Gait  Skin:  · No rash.     · Normal skin turgor  Neurologic/Psychiatric:  · Alert and oriented in all spheres  · Normal mood and affect  · Memory and mentation intact      I have reviewed all pertinent lab results and diagnostic testing. Lab Results   Component Value Date    CHOL 137 02/21/2018    TRIG 69 02/27/2019    HDL 50 02/27/2019    LDLCALC 58 02/27/2019     Lab Results   Component Value Date     02/27/2019    K 4.0 02/27/2019     02/27/2019    CO2 28 02/27/2019    GLUCOSE 98 02/27/2019    BUN 22 02/27/2019    CREATININE 1.2 02/21/2018    CALCIUM 8.6 02/27/2019    GFRAA > 60 02/27/2019     Lab Results   Component Value Date    ALT 35 02/27/2019    AST 27 02/27/2019       Myoview GXT 9/5/18:      Summary    There is normal isotope uptake at stress and rest. There is no evidence of    myocardial ischemia or scar.    Normal LV function.    Overall findings represent a low risk scan.           Myoview stress test 8/17/16:  Summary       No EKG evidence for ischemia with exercise       Preserved LV systolic function. LVEF 65%       Myocardial perfusion imaging with no evidence for ischemia with exercise       Excessive motion noted with imaging which may impact diagnostic accuracy.             Cardiac Cath 5/18/16:  Anatomy:   LM-normal  LAD-50% mid, 70% distal at the apex  D1-50% ostial, 70% proximal  Cx-99% mid with left to left collaterals  RCA-70% mid long, 99% distal hazy  RPDA- patent  LVEF- 50 % with inferior hypokinesis, no significant mitral regurgitation  PCI: Circumflex 99% to 0% mid with a 2.25 mm x 26 mm Medtronic resolute drug-eluting stent postdilated with a 2.5 mm noncompliant balloon. RCA 70% to 0% mid with 2.5 mm x 30 mm Medtronic resolute eluding stent and a 2.5 mm x 22 mm Medtronic resolute drug-eluting stent. The distal RCA is 99% to 0% with a 2.25 mm x 22 mm Medtronic resolute drug-eluting stent.     Impression:  1. Severe multivessel coronary artery disease  2. Successful drug-eluting stent implantation in the RCA ×3 and a circumflex ×1  3. Borderline severe CAD of the distal LAD at the apex in the proximal diagonal  4. Preserved LV systolic function     Plan:  1.  Effient/equivalent for minimum RN    Physician Attestation: The scribe's documentation has been prepared under my direction and personally reviewed by me in its entirety. I confirm that the note above accurately reflects all work, treatment, procedures, and medical decision making performed by me. An  electronic signature was used to authenticate this note. Mary Ann Angela MD, [facility] [place], [address]

## 2019-04-24 ENCOUNTER — OFFICE VISIT (OUTPATIENT)
Dept: CARDIOLOGY CLINIC | Age: 79
End: 2019-04-24
Payer: MEDICARE

## 2019-04-24 VITALS
HEART RATE: 66 BPM | HEIGHT: 67 IN | SYSTOLIC BLOOD PRESSURE: 130 MMHG | DIASTOLIC BLOOD PRESSURE: 72 MMHG | OXYGEN SATURATION: 93 % | WEIGHT: 188.12 LBS | BODY MASS INDEX: 29.53 KG/M2

## 2019-04-24 DIAGNOSIS — Z95.5 STATUS POST INSERTION OF DRUG-ELUTING STENT INTO RIGHT CORONARY ARTERY FOR CORONARY ARTERY DISEASE: Chronic | ICD-10-CM

## 2019-04-24 DIAGNOSIS — Z95.5 S/P DRUG ELUTING CORONARY STENT PLACEMENT: Chronic | ICD-10-CM

## 2019-04-24 DIAGNOSIS — I10 ESSENTIAL HYPERTENSION: Chronic | ICD-10-CM

## 2019-04-24 DIAGNOSIS — E78.5 DYSLIPIDEMIA: ICD-10-CM

## 2019-04-24 DIAGNOSIS — I25.110 CORONARY ARTERY DISEASE INVOLVING NATIVE CORONARY ARTERY OF NATIVE HEART WITH UNSTABLE ANGINA PECTORIS (HCC): Primary | Chronic | ICD-10-CM

## 2019-04-24 DIAGNOSIS — R53.82 CHRONIC FATIGUE: ICD-10-CM

## 2019-04-24 PROCEDURE — 1036F TOBACCO NON-USER: CPT | Performed by: INTERNAL MEDICINE

## 2019-04-24 PROCEDURE — G8419 CALC BMI OUT NRM PARAM NOF/U: HCPCS | Performed by: INTERNAL MEDICINE

## 2019-04-24 PROCEDURE — G8598 ASA/ANTIPLAT THER USED: HCPCS | Performed by: INTERNAL MEDICINE

## 2019-04-24 PROCEDURE — 1123F ACP DISCUSS/DSCN MKR DOCD: CPT | Performed by: INTERNAL MEDICINE

## 2019-04-24 PROCEDURE — G8427 DOCREV CUR MEDS BY ELIG CLIN: HCPCS | Performed by: INTERNAL MEDICINE

## 2019-04-24 PROCEDURE — 4040F PNEUMOC VAC/ADMIN/RCVD: CPT | Performed by: INTERNAL MEDICINE

## 2019-04-24 PROCEDURE — 99213 OFFICE O/P EST LOW 20 MIN: CPT | Performed by: INTERNAL MEDICINE

## 2019-04-24 NOTE — LETTER
lisinopril (PRINIVIL;ZESTRIL) 10 MG tablet Take 1 tablet by mouth daily Yes Geronimo Acevedo MD   atorvastatin (LIPITOR) 80 MG tablet Take 1 tablet by mouth daily Yes Geronimo Acevedo MD   clopidogrel (PLAVIX) 75 MG tablet Take 1 tablet by mouth daily Yes Geronimo Acevedo MD        Allergies   Allergen Reactions    Pcn [Penicillins] Hives       Past Medical History:   Diagnosis Date    Actinic keratosis     Allergic rhinitis, cause unspecified     Degeneration of lumbar or lumbosacral intervertebral disc     Other abnormal blood chemistry     Routine general medical examination at a health care facility     Unspecified essential hypertension        Past Surgical History:   Procedure Laterality Date    APPENDECTOMY      OTHER SURGICAL HISTORY  12/98    basal cell CA-; back-removed 12/98        Social History     Tobacco Use    Smoking status: Never Smoker    Smokeless tobacco: Never Used   Substance Use Topics    Alcohol use: No     Alcohol/week: 0.6 oz     Types: 1 Cans of beer per week        Family History   Problem Relation Age of Onset    Heart Disease Other        PHYSICAL EXAMINATION:  Vitals:    04/24/19 0916   BP: 130/72   Site: Left Upper Arm   Position: Sitting   Cuff Size: Large Adult   Pulse: 66   SpO2: 93%   Weight: 188 lb 1.9 oz (85.3 kg)   Height: 5' 7\" (1.702 m)     Estimated body mass index is 29.46 kg/m² as calculated from the following:    Height as of this encounter: 5' 7\" (1.702 m). Weight as of this encounter: 188 lb 1.9 oz (85.3 kg).       General Appearance: No apparent distress  Eyes:  · Conjunctiva clear  · Pupils equal, round, reactive to light  ENT:  · External Ears and Nose unremarkable  · Oral mucosa is moist  Respiratory:  · Normal excursion and expansion without use of accessory muscles  · Resp Auscultation: Normal breath sounds without dullness  Cardiovascular:  · JVD is normal  · The carotid upstroke is normal in amplitude and contour without delay or bruit · Apical impulse is not displaced. · Normal S1, S2. No S3. No Murmur. Regular rate and rhythm  · No edema. · Pedal Pulses: 2+ and equal.   Abdomen:  · No masses or tenderness  · Liver/Spleen: No Abnormalities Noted  Musculoskeletal:  · Fingers without clubbing or cyanosis  · Normal Gait  Skin:  · No rash. · Normal skin turgor  Neurologic/Psychiatric:  · Alert and oriented in all spheres  · Normal mood and affect  · Memory and mentation intact      I have reviewed all pertinent lab results and diagnostic testing. Lab Results   Component Value Date    CHOL 137 02/21/2018    TRIG 69 02/27/2019    HDL 50 02/27/2019    LDLCALC 58 02/27/2019     Lab Results   Component Value Date     02/27/2019    K 4.0 02/27/2019     02/27/2019    CO2 28 02/27/2019    GLUCOSE 98 02/27/2019    BUN 22 02/27/2019    CREATININE 1.2 02/21/2018    CALCIUM 8.6 02/27/2019    GFRAA > 60 02/27/2019     Lab Results   Component Value Date    ALT 35 02/27/2019    AST 27 02/27/2019       Myoview GXT 9/5/18:      Summary    There is normal isotope uptake at stress and rest. There is no evidence of    myocardial ischemia or scar.    Normal LV function.    Overall findings represent a low risk scan.           Myoview stress test 8/17/16:  Summary       No EKG evidence for ischemia with exercise       Preserved LV systolic function. LVEF 65%       Myocardial perfusion imaging with no evidence for ischemia with exercise       Excessive motion noted with imaging which may impact diagnostic accuracy.             Cardiac Cath 5/18/16:  Anatomy:   LM-normal  LAD-50% mid, 70% distal at the apex  D1-50% ostial, 70% proximal  Cx-99% mid with left to left collaterals  RCA-70% mid long, 99% distal hazy  RPDA- patent  LVEF- 50 % with inferior hypokinesis, no significant mitral regurgitation  PCI: Circumflex 99% to 0% mid with a 2.25 mm x 26 mm Medtronic resolute drug-eluting stent postdilated with a 2.5 mm noncompliant balloon.  RCA 70% ~ sleeps poorly at night. Referred to Dr. Janae Jackson, but no appointment made (patient not interested)  ~ 8/2018  TSH- 2.32, Free T3- 3.2, Free T4 - 1.4  ~ no change in fatigue     Plan >  Encouraged to walk more and monitor for improvement in fatigue. Plan:  1. No change in medications  2. Labs per PCP  3. Try to take walks more frequently - aim for 4 times per week. 4.  Follow up in one year       Scribe's attestation: This note was scribed in the presence of Chay Gray M.D. by Alvarez Daigle RN    Physician Attestation: The scribe's documentation has been prepared under my direction and personally reviewed by me in its entirety. I confirm that the note above accurately reflects all work, treatment, procedures, and medical decision making performed by me. An  electronic signature was used to authenticate this note. Olesya Mccormick MD, [facility]      If you have questions, please do not hesitate to call me. I look forward to following Anabella Brunner along with you.     Sincerely,        Chay Gray MD

## 2019-06-12 DIAGNOSIS — I10 ESSENTIAL HYPERTENSION: Chronic | ICD-10-CM

## 2019-06-12 RX ORDER — LISINOPRIL 10 MG/1
TABLET ORAL
Qty: 90 TABLET | Refills: 3 | Status: SHIPPED | OUTPATIENT
Start: 2019-06-12 | End: 2020-03-30

## 2019-06-17 DIAGNOSIS — I25.83 CORONARY ARTERY DISEASE DUE TO LIPID RICH PLAQUE: ICD-10-CM

## 2019-06-17 DIAGNOSIS — I25.10 CORONARY ARTERY DISEASE DUE TO LIPID RICH PLAQUE: ICD-10-CM

## 2019-06-18 RX ORDER — CLOPIDOGREL BISULFATE 75 MG/1
TABLET ORAL
Qty: 90 TABLET | Refills: 3 | Status: SHIPPED | OUTPATIENT
Start: 2019-06-18 | End: 2020-03-30

## 2019-06-18 NOTE — TELEPHONE ENCOUNTER
Lov 4/24/2019 RTO in 1 yr  Labs 2/27/2019  Lrf 8/22/2018 Disp 90+3  Appt No appt scheduled  Please advise thanks.

## 2019-07-15 DIAGNOSIS — E78.5 DYSLIPIDEMIA: ICD-10-CM

## 2019-07-16 RX ORDER — ATORVASTATIN CALCIUM 80 MG/1
TABLET, FILM COATED ORAL
Qty: 90 TABLET | Refills: 3 | Status: SHIPPED | OUTPATIENT
Start: 2019-07-16 | End: 2020-05-11

## 2020-01-21 ENCOUNTER — OFFICE VISIT (OUTPATIENT)
Dept: FAMILY MEDICINE CLINIC | Age: 80
End: 2020-01-21
Payer: MEDICARE

## 2020-01-21 VITALS
HEART RATE: 73 BPM | DIASTOLIC BLOOD PRESSURE: 62 MMHG | WEIGHT: 191 LBS | BODY MASS INDEX: 29.91 KG/M2 | OXYGEN SATURATION: 97 % | SYSTOLIC BLOOD PRESSURE: 120 MMHG | TEMPERATURE: 98.4 F

## 2020-01-21 PROCEDURE — 1123F ACP DISCUSS/DSCN MKR DOCD: CPT | Performed by: FAMILY MEDICINE

## 2020-01-21 PROCEDURE — G8427 DOCREV CUR MEDS BY ELIG CLIN: HCPCS | Performed by: FAMILY MEDICINE

## 2020-01-21 PROCEDURE — G8484 FLU IMMUNIZE NO ADMIN: HCPCS | Performed by: FAMILY MEDICINE

## 2020-01-21 PROCEDURE — 4040F PNEUMOC VAC/ADMIN/RCVD: CPT | Performed by: FAMILY MEDICINE

## 2020-01-21 PROCEDURE — 1036F TOBACCO NON-USER: CPT | Performed by: FAMILY MEDICINE

## 2020-01-21 PROCEDURE — 99213 OFFICE O/P EST LOW 20 MIN: CPT | Performed by: FAMILY MEDICINE

## 2020-01-21 PROCEDURE — G8417 CALC BMI ABV UP PARAM F/U: HCPCS | Performed by: FAMILY MEDICINE

## 2020-01-21 ASSESSMENT — PATIENT HEALTH QUESTIONNAIRE - PHQ9
SUM OF ALL RESPONSES TO PHQ QUESTIONS 1-9: 0
2. FEELING DOWN, DEPRESSED OR HOPELESS: 0
SUM OF ALL RESPONSES TO PHQ QUESTIONS 1-9: 0
SUM OF ALL RESPONSES TO PHQ9 QUESTIONS 1 & 2: 0
1. LITTLE INTEREST OR PLEASURE IN DOING THINGS: 0

## 2020-01-21 NOTE — PROGRESS NOTES
performed history and physical.  I also confirm that the note above accurately reflects all work, treatment, procedures, and medical decision making performed by me, Lester Benjamin MD

## 2020-01-22 LAB
A/G RATIO: 1.1 (ref 1–2)
ALBUMIN SERPL-MCNC: 6.9 G/DL (ref 6.4–8.2)
ALBUMIN SERUM: 3.6 G/DL (ref 3.4–5)
ALP BLD-CCNC: 89 U/L (ref 45–117)
ALT SERPL-CCNC: 31 U/L (ref 12–78)
ANION GAP SERPL CALCULATED.3IONS-SCNC: 4 MMOL/L (ref 7–16)
AST SERPL-CCNC: 22 U/L (ref 15–37)
BILIRUBIN: 0.8 MG/DL (ref 0.2–1)
BUN BLDV-MCNC: 20 MG/DL (ref 7–18)
CALCIUM SERPL-MCNC: 8.7 MG/DL (ref 8.5–10.1)
CHLORIDE BLD-SCNC: 107 MMOL/L (ref 98–107)
CHOLESTEROL, STONE: 123 MG/DL
CO2: 31 MMOL/L (ref 21–32)
CREATININE + EGFR PANEL: 1.3 MG/DL (ref 0.6–1.3)
GFR AFRICAN AMERICAN: > 60 ML/MIN/1.73M2
GFR NON-AFRICAN AMERICAN: 53 ML/MIN/1.73M2
GLOBULIN: 3.3 G/DL (ref 2.6–4.2)
GLUCOSE: 99 MG/DL (ref 74–106)
HDLC SERPL-MCNC: 51 MG/DL (ref 40–60)
LDL CHOLESTEROL CALCULATED: 59 MG/DL (ref 0–99)
POTASSIUM SERPL-SCNC: 4 MMOL/L (ref 3.5–5.1)
SODIUM BLD-SCNC: 142 MMOL/L (ref 136–145)
TRIGL SERPL-MCNC: 67 MG/DL (ref 0–149)
VLDLC SERPL CALC-MCNC: 13 MG/DL (ref 0–40)

## 2020-02-19 ENCOUNTER — OFFICE VISIT (OUTPATIENT)
Dept: FAMILY MEDICINE CLINIC | Age: 80
End: 2020-02-19
Payer: MEDICARE

## 2020-02-19 VITALS
DIASTOLIC BLOOD PRESSURE: 68 MMHG | HEIGHT: 67 IN | HEART RATE: 68 BPM | BODY MASS INDEX: 28.88 KG/M2 | SYSTOLIC BLOOD PRESSURE: 124 MMHG | WEIGHT: 184 LBS | OXYGEN SATURATION: 96 %

## 2020-02-19 PROCEDURE — 4040F PNEUMOC VAC/ADMIN/RCVD: CPT | Performed by: FAMILY MEDICINE

## 2020-02-19 PROCEDURE — G0439 PPPS, SUBSEQ VISIT: HCPCS | Performed by: FAMILY MEDICINE

## 2020-02-19 PROCEDURE — G8484 FLU IMMUNIZE NO ADMIN: HCPCS | Performed by: FAMILY MEDICINE

## 2020-02-19 PROCEDURE — 1123F ACP DISCUSS/DSCN MKR DOCD: CPT | Performed by: FAMILY MEDICINE

## 2020-02-19 NOTE — PROGRESS NOTES
[facility] VISIT    Patient is here for their Medicare Annual Wellness Visit. 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 no no no no no no   Fall with injury in past year? no no no no no no       PHQ Scores 2/19/2020 1/21/2020 4/11/2018 11/30/2016   PHQ2 Score 0 0 0 0   PHQ9 Score 0 0 0 0         Have you noted any problems with hearing?: No  Have you noted any vision problems?: Yes    Do you take opioid medications even sometimes? No    Living Will: Yes,   Copy requested    Do you need help with:  Using the phone:  No  Bathing: No  Dressing:  No  Toileting: No  Transportation:  No, has no issues driving, no accidents  Shopping: No  Preparing meals: No  Housework/Laundry: No  Medications: No  Money management: No    Does your home have:  Unsecured throw rugs: Yes: kitchen, front door, in front of recliner  Grab bars in bathroom: No  Walk in shower: Yes  Seat in shower: No  Lit pathways for night (nightlights): No, but has a lamp he can turn on next to bed    Memory:  Have you or anyone close to you expressed concerns about your memory: Patient has no concerns but wife does. Wife states he will tell you something 4 times in a 20 minute span or you have to tell him something over and over. Son also feels his memory is bad. Is good with keeping up with finances. Knows:  Month: Yes  Day: Yes  Year: Yes  Day of Week: Yes  Able to Recall (apple, boat, leon) : No, 2/3, forgot boat    Patient history:   Patient's medications, allergies, past medical, surgical, social and family histories were reviewed and updated in the EHR.      Care Team:  Patient's list of care team members was updated in EHR. Immunizations: up to date and documented    Health Maintenance Due   Topic Date Due    Shingles Vaccine (1 of 2) 12/21/1991    Annual Wellness Visit (AWV)  05/29/2019       CHRONIC CONDITIONS     Checks BP at home: No  Taking medication daily: Yes, lisinopril  Side Effects of medication: no    Taking cholesterol medication regularly as prescribed with no SE. Follows with Dr. Trang Callahan once a year, due in April 2020. Patient does experience a HA about once every two weeks, always in the same spot, back of head, has occurred for the last 6 months. He does not take any medication for HA, usually goes away in about 30 minutes. Patient states he does not feel nauseous or have vision issues when having HA. Usually just massages the area and it goes away. Patient's medications, allergies, past medical, surgical, social and family histories were reviewed and updated in the EHR as appropriate. No CP, no palpitations, no sob, no edema, no cough, no change in bowel or bladder,  no nausea, no vomiting, no abdominal pain      Physical Exam:    Body mass index is 29.25 kg/m². Vitals:    02/19/20 1402   BP: 124/68   Site: Left Upper Arm   Position: Sitting   Cuff Size: Medium Adult   Pulse: 68   SpO2: 96%   Weight: 184 lb (83.5 kg)   Height: 5' 6.5\" (1.689 m)     Wt Readings from Last 3 Encounters:   02/19/20 184 lb (83.5 kg)   01/21/20 191 lb (86.6 kg)   04/24/19 188 lb 1.9 oz (85.3 kg)       GENERAL:Alert and oriented x 4 NAD, affect appropriate and overweight, well hydrated, well developed.   NECK:supple and non tender without mass, no thyromegaly or thyroid nodules, no cervical lymphadenopathy  LUNG:clear to auscultation bilaterally with normal respiratory effort  CV: Normal heart sounds, regular rate and rhythm without murmurs  EXTREMETY: no loss of hair, no edema, normal pedal pulses bilaterally    Was the timed get up and go unsteady or longer than 30 seconds: No    Vision Screen for Initial Exam: not applicable    EKG for Initial Exam at 73 (): no    AAA U/S screen for men 65-75 who smoked (): not applicable    Assessment/Plan:    Geni James was seen today for medicare awv. Diagnoses and all orders for this visit:    Well adult exam    Recommended screenings discussed and ordered if patient agreed  Recommended vaccinations discussed and ordered if patient agreed  Encouraged healthy diet   Encouraged regular exercise and maintaining a healthy weight      Medicare Safety Interventions: Home safety tips provided  Individualized [address] [place] [address] Avenue included in patient instructions and AVS        Return in about 1 year (around 2/19/2021) for AMW - 30 minutes.          Scribe attestation: Chantale SESAY am scribing for and in the presence of Lester Benjamin MD. Electronically signed by LÁZARO Peters on 2/19/20 at 2:35 PM    Note per LÁZARO Peters and Scribe with corrections and edits per Lester Benjamin MD.  I agree with entirety of note and was present and performed history and physical.  I also confirm that the note above accurately reflects all work, treatment, procedures, and medical decision making performed by me, Lester Benjamin MD

## 2020-02-19 NOTE — PATIENT INSTRUCTIONS
Patient Education     Provide office with a copy of living will. Well Visit, Over 72: Care Instructions  Your Care Instructions    Physical exams can help you stay healthy. Your doctor has checked your overall health and may have suggested ways to take good care of yourself. He or she also may have recommended tests. At home, you can help prevent illness with healthy eating, regular exercise, and other steps. Follow-up care is a key part of your treatment and safety. Be sure to make and go to all appointments, and call your doctor if you are having problems. It's also a good idea to know your test results and keep a list of the medicines you take. How can you care for yourself at home? · Reach and stay at a healthy weight. This will lower your risk for many problems, such as obesity, diabetes, heart disease, and high blood pressure. · Get at least 30 minutes of exercise on most days of the week. Walking is a good choice. You also may want to do other activities, such as running, swimming, cycling, or playing tennis or team sports. · Do not smoke. Smoking can make health problems worse. If you need help quitting, talk to your doctor about stop-smoking programs and medicines. These can increase your chances of quitting for good. · Protect your skin from too much sun. When you're outdoors from 10 a.m. to 4 p.m., stay in the shade or cover up with clothing and a hat with a wide brim. Wear sunglasses that block UV rays. Even when it's cloudy, put broad-spectrum sunscreen (SPF 30 or higher) on any exposed skin. · See a dentist one or two times a year for checkups and to have your teeth cleaned. · Wear a seat belt in the car. Follow your doctor's advice about when to have certain tests. These tests can spot problems early. For men and women  · Cholesterol.  Your doctor will tell you how often to have this done based on your overall health and other things that can increase your risk for heart attack and

## 2020-03-31 RX ORDER — LISINOPRIL 10 MG/1
TABLET ORAL
Qty: 90 TABLET | Refills: 1 | Status: SHIPPED | OUTPATIENT
Start: 2020-03-31 | End: 2020-08-10

## 2020-03-31 RX ORDER — CLOPIDOGREL BISULFATE 75 MG/1
TABLET ORAL
Qty: 90 TABLET | Refills: 1 | Status: SHIPPED | OUTPATIENT
Start: 2020-03-31 | End: 2020-08-10

## 2020-05-11 RX ORDER — ATORVASTATIN CALCIUM 80 MG/1
TABLET, FILM COATED ORAL
Qty: 90 TABLET | Refills: 3 | Status: SHIPPED | OUTPATIENT
Start: 2020-05-11 | End: 2021-02-23

## 2020-05-13 ENCOUNTER — OFFICE VISIT (OUTPATIENT)
Dept: CARDIOLOGY CLINIC | Age: 80
End: 2020-05-13
Payer: MEDICARE

## 2020-05-13 VITALS
WEIGHT: 189.4 LBS | HEART RATE: 79 BPM | BODY MASS INDEX: 28.7 KG/M2 | SYSTOLIC BLOOD PRESSURE: 126 MMHG | DIASTOLIC BLOOD PRESSURE: 64 MMHG | OXYGEN SATURATION: 97 % | RESPIRATION RATE: 18 BRPM | HEIGHT: 68 IN

## 2020-05-13 PROCEDURE — G8427 DOCREV CUR MEDS BY ELIG CLIN: HCPCS | Performed by: INTERNAL MEDICINE

## 2020-05-13 PROCEDURE — G8417 CALC BMI ABV UP PARAM F/U: HCPCS | Performed by: INTERNAL MEDICINE

## 2020-05-13 PROCEDURE — 1036F TOBACCO NON-USER: CPT | Performed by: INTERNAL MEDICINE

## 2020-05-13 PROCEDURE — 1123F ACP DISCUSS/DSCN MKR DOCD: CPT | Performed by: INTERNAL MEDICINE

## 2020-05-13 PROCEDURE — 4040F PNEUMOC VAC/ADMIN/RCVD: CPT | Performed by: INTERNAL MEDICINE

## 2020-05-13 PROCEDURE — 99214 OFFICE O/P EST MOD 30 MIN: CPT | Performed by: INTERNAL MEDICINE

## 2020-05-13 NOTE — PATIENT INSTRUCTIONS
1.  No change in medications  2. Remain active, try to exercise more. Eat healthy diet and limit snacks. 3.  Call office for any concerning symptoms  4. Labs per Dr. Beatriz Ryan  5.   Follow up in one year

## 2020-05-13 NOTE — LETTER
· The carotid upstroke is normal in amplitude and contour without delay or bruit  · Apical impulse is not displaced. · Normal S1, S2. No S3. No Murmur. Regular rate and rhythm. No change from last visit  · No edema. · Pedal Pulses: 2+ and equal.   Abdomen:  · No masses or tenderness  · Liver/Spleen: No Abnormalities Noted  Musculoskeletal:  · Fingers without clubbing or cyanosis  · Normal Gait  Skin:  · No rash. · Normal skin turgor  Neurologic/Psychiatric:  · Alert and oriented in all spheres  · Normal mood and affect  · Memory and mentation intact    I have reviewed all pertinent lab results and diagnostic testing. Lab Results   Component Value Date    CHOL 137 02/21/2018    TRIG 67 01/22/2020    HDL 51 01/22/2020    LDLCALC 59 01/22/2020     Lab Results   Component Value Date     01/22/2020    K 4.0 01/22/2020     01/22/2020    CO2 31 01/22/2020    GLUCOSE 99 01/22/2020    BUN 20 01/22/2020    CREATININE 1.2 02/21/2018    CALCIUM 8.7 01/22/2020    GFRAA > 60 01/22/2020     Lab Results   Component Value Date    ALT 31 01/22/2020    AST 22 01/22/2020       Myoview GXT 9/5/18:      Summary    There is normal isotope uptake at stress and rest. There is no evidence of    myocardial ischemia or scar.    Normal LV function.    Overall findings represent a low risk scan.           Myoview stress test 8/17/16:  Summary       No EKG evidence for ischemia with exercise       Preserved LV systolic function.   LVEF 65%       Myocardial perfusion imaging with no evidence for ischemia with exercise       Excessive motion noted with imaging which may impact diagnostic accuracy.             Cardiac Cath 5/18/16:  Anatomy:   LM-normal  LAD-50% mid, 70% distal at the apex  D1-50% ostial, 70% proximal  Cx-99% mid with left to left collaterals  RCA-70% mid long, 99% distal hazy  RPDA- patent  LVEF- 50 % with inferior hypokinesis, no significant mitral regurgitation PCI: Circumflex 99% to 0% mid with a 2.25 mm x 26 mm Medtronic resolute drug-eluting stent postdilated with a 2.5 mm noncompliant balloon. RCA 70% to 0% mid with 2.5 mm x 30 mm Medtronic resolute eluding stent and a 2.5 mm x 22 mm Medtronic resolute drug-eluting stent. The distal RCA is 99% to 0% with a 2.25 mm x 22 mm Medtronic resolute drug-eluting stent.     Impression:  1. Severe multivessel coronary artery disease  2. Successful drug-eluting stent implantation in the RCA ×3 and a circumflex ×1  3. Borderline severe CAD of the distal LAD at the apex in the proximal diagonal  4. Preserved LV systolic function     Plan:  1. Effient/equivalent for minimum of 1 year  2. Aspirin indefinitely  3. No beta blocker due to bradycardia  4. Attempted medical therapy of the distal LAD and diagonal. Cardiac risk stratification with exercise Myoview stress testing and approximately 3-6 months.         ASSESSMENT/PLAN:  1. Coronary artery disease due to lipid rich plaque  ~ 5/18/16 [facility] - severe multivessel disease - s/p RHETT x 3 to RCA, RHETT x 1 to LCx. Borderline distal LAD and diagonal disease.    ~ 9/5/18 Myoview GXT (c/o of SOB when walking longer distances) -- normal perfusion, normal EF.    ~ treated with  Plavix, statin, (no aspirin). Tolerating medications without side effects. No bleeding or unusual bruising  ~ no exertional chest pain, breathing is stable    Plan > stable, continue present management, monitor for anginal symptoms    2. Essential hypertension  ~ Blood pressure 126/64, pulse 79, resp. rate 18, height 5' 8\" (1.727 m), weight 189 lb 6.4 oz (85.9 kg), SpO2 97 %.  ~ treated with Lisinopril. Tolerating medications without side effects  ~ 1/22/20 -- K+ 4.0, Creat with GFR panel - 1.3     Plan > BP stable, continue present management    3. Dyslipidemia  ~ 1/22/20 -- Chol - 122, TG- 67, HDL- 51, LDL- 59. Liver enzymes normal.    ~ treated with Lipitor 80 mg daily.   Tolerating medications without side effects    Plan > stable, continue present management    4. Fatigue  ~ sleeps poorly at night. Referred to Dr. Clemencia Cortés, but no appointment made (patient not interested)  ~2/27/19 TSH 2.1   ~ no significant complaints today      Plan:  1. No change in medications  2. Remain active, try to exercise more  3. Call office for any concerning symptoms  4. Labs per Dr. Rick Dooley  5. Follow up in one year   6. Discuss pain in back of head with Dr. Rick Dooley. Scribe's attestation: This note was scribed in the presence of Christie Stein M.D. by Ayde Enriquez RN    Physician Attestation: The scribe's documentation has been prepared under my direction and personally reviewed by me in its entirety. I confirm that the note above accurately reflects all work, treatment, procedures, and medical decision making performed by me. An  electronic signature was used to authenticate this note. Birtha Linker Lamond Sacks, MD, [facility]    If you have questions, please do not hesitate to call me. I look forward to following Sherri Camacho along with you.     Sincerely,        Christie Stein MD

## 2020-05-13 NOTE — PROGRESS NOTES
[address]    2020     Iris Jovel (:  1940) is a 67 y.o. male who is here for a yearly visit for the management of coronary artery disease and modifiable risk factors. Referring Provider: Michelle Ga MD    HISTORY:  Mr. Kristin Handy has a history of CAD, s/p RHETT of RCA x 3 and Lcx x 1. He had borderline disease in the distal LAD and Diagonal which has been treated medically. Additional history includes HTN and hyperlipidemia. Today, he is doing well from the cardiac standpoint. He denies exertional chest pain, palpitations, dizziness. He only gets short of breath if he tries to jog, otherwise he has no breathing issues. He stays active overall. His only complaint is of headaches (sharp, back of head) about twice a week. He still works in a [employer] shop. Wife is concerned about his lack of exercise and eating snacks (cookies with peanut butter) later in the evening. Patient is compliant with medications and is tolerating them well without side effects. REVIEW OF SYSTEMS:  A complete review of systems was reviewed and is negative except as noted in the history of present illness. Prior to Visit Medications    Medication Sig Taking?  Authorizing Provider   atorvastatin (LIPITOR) 80 MG tablet TAKE 1 TABLET EVERY DAY Yes Erica Reyes MD   lisinopril (PRINIVIL;ZESTRIL) 10 MG tablet TAKE 1 TABLET EVERY DAY Yes Erica Reyes MD   clopidogrel (PLAVIX) 75 MG tablet TAKE 1 TABLET EVERY DAY Yes Erica Reyes MD   nitroGLYCERIN (NITROSTAT) 0.4 MG SL tablet PLACE 1 TABLET UNDER THE TONGUE EVERY 5 MINUTES AS NEEDED FOR CHEST PAIN Yes Erica Reyes MD        Allergies   Allergen Reactions    Pcn [Penicillins] Hives       Past Medical History:   Diagnosis Date    Actinic keratosis     Allergic rhinitis, cause unspecified     Degeneration of lumbar or lumbosacral intervertebral disc     Other abnormal blood chemistry     Routine general medical spheres  · Normal mood and affect  · Memory and mentation intact    I have reviewed all pertinent lab results and diagnostic testing. Lab Results   Component Value Date    CHOL 137 02/21/2018    TRIG 67 01/22/2020    HDL 51 01/22/2020    LDLCALC 59 01/22/2020     Lab Results   Component Value Date     01/22/2020    K 4.0 01/22/2020     01/22/2020    CO2 31 01/22/2020    GLUCOSE 99 01/22/2020    BUN 20 01/22/2020    CREATININE 1.2 02/21/2018    CALCIUM 8.7 01/22/2020    GFRAA > 60 01/22/2020     Lab Results   Component Value Date    ALT 31 01/22/2020    AST 22 01/22/2020       Myoview GXT 9/5/18:      Summary    There is normal isotope uptake at stress and rest. There is no evidence of    myocardial ischemia or scar.    Normal LV function.    Overall findings represent a low risk scan.           Myoview stress test 8/17/16:  Summary       No EKG evidence for ischemia with exercise       Preserved LV systolic function. LVEF 65%       Myocardial perfusion imaging with no evidence for ischemia with exercise       Excessive motion noted with imaging which may impact diagnostic accuracy.             Cardiac Cath 5/18/16:  Anatomy:   LM-normal  LAD-50% mid, 70% distal at the apex  D1-50% ostial, 70% proximal  Cx-99% mid with left to left collaterals  RCA-70% mid long, 99% distal hazy  RPDA- patent  LVEF- 50 % with inferior hypokinesis, no significant mitral regurgitation  PCI: Circumflex 99% to 0% mid with a 2.25 mm x 26 mm Medtronic resolute drug-eluting stent postdilated with a 2.5 mm noncompliant balloon. RCA 70% to 0% mid with 2.5 mm x 30 mm Medtronic resolute eluding stent and a 2.5 mm x 22 mm Medtronic resolute drug-eluting stent. The distal RCA is 99% to 0% with a 2.25 mm x 22 mm Medtronic resolute drug-eluting stent.     Impression:  1. Severe multivessel coronary artery disease  2. Successful drug-eluting stent implantation in the RCA ×3 and a circumflex ×1  3.  Borderline severe CAD of the distal LAD at the apex in the proximal diagonal  4. Preserved LV systolic function     Plan:  1. Effient/equivalent for minimum of 1 year  2. Aspirin indefinitely  3. No beta blocker due to bradycardia  4. Attempted medical therapy of the distal LAD and diagonal. Cardiac risk stratification with exercise Myoview stress testing and approximately 3-6 months.         ASSESSMENT/PLAN:  1. Coronary artery disease due to lipid rich plaque  ~ 5/18/16 [facility] - severe multivessel disease - s/p RHETT x 3 to RCA, RHETT x 1 to LCx. Borderline distal LAD and diagonal disease.    ~ 9/5/18 Myoview GXT (c/o of SOB when walking longer distances) -- normal perfusion, normal EF.    ~ treated with  Plavix, statin, (no aspirin). Tolerating medications without side effects. No bleeding or unusual bruising  ~ no exertional chest pain, breathing is stable    Plan > stable, continue present management, monitor for anginal symptoms    2. Essential hypertension  ~ Blood pressure 126/64, pulse 79, resp. rate 18, height 5' 8\" (1.727 m), weight 189 lb 6.4 oz (85.9 kg), SpO2 97 %.  ~ treated with Lisinopril. Tolerating medications without side effects  ~ 1/22/20 -- K+ 4.0, Creat with GFR panel - 1.3     Plan > BP stable, continue present management    3. Dyslipidemia  ~ 1/22/20 -- Chol - 122, TG- 67, HDL- 51, LDL- 59. Liver enzymes normal.    ~ treated with Lipitor 80 mg daily. Tolerating medications without side effects    Plan > stable, continue present management    4. Fatigue  ~ sleeps poorly at night. Referred to Dr. Vuong Cancer, but no appointment made (patient not interested)  ~2/27/19 TSH 2.1   ~ no significant complaints today      Plan:  1. No change in medications  2. Remain active, try to exercise more  3. Call office for any concerning symptoms  4. Labs per Dr. Laine Davis  5. Follow up in one year   6. Discuss pain in back of head with Dr. Laine Davis. Scribe's attestation:   This note was scribed in the presence of Servando Kyle M.D. by

## 2020-07-20 ENCOUNTER — TELEPHONE (OUTPATIENT)
Dept: FAMILY MEDICINE CLINIC | Age: 80
End: 2020-07-20

## 2020-07-21 ENCOUNTER — NURSE ONLY (OUTPATIENT)
Dept: FAMILY MEDICINE CLINIC | Age: 80
End: 2020-07-21
Payer: MEDICARE

## 2020-07-21 PROCEDURE — 90715 TDAP VACCINE 7 YRS/> IM: CPT | Performed by: FAMILY MEDICINE

## 2020-07-21 PROCEDURE — 90471 IMMUNIZATION ADMIN: CPT | Performed by: FAMILY MEDICINE

## 2020-08-10 RX ORDER — CLOPIDOGREL BISULFATE 75 MG/1
TABLET ORAL
Qty: 90 TABLET | Refills: 3 | Status: SHIPPED | OUTPATIENT
Start: 2020-08-10 | End: 2021-05-17

## 2020-08-10 RX ORDER — LISINOPRIL 10 MG/1
TABLET ORAL
Qty: 90 TABLET | Refills: 3 | Status: SHIPPED | OUTPATIENT
Start: 2020-08-10 | End: 2021-05-17

## 2020-12-02 ENCOUNTER — OFFICE VISIT (OUTPATIENT)
Dept: CARDIOLOGY CLINIC | Age: 80
End: 2020-12-02
Payer: MEDICARE

## 2020-12-02 VITALS
SYSTOLIC BLOOD PRESSURE: 134 MMHG | OXYGEN SATURATION: 97 % | BODY MASS INDEX: 28.49 KG/M2 | DIASTOLIC BLOOD PRESSURE: 72 MMHG | HEIGHT: 68 IN | WEIGHT: 188 LBS | HEART RATE: 71 BPM

## 2020-12-02 PROCEDURE — G8417 CALC BMI ABV UP PARAM F/U: HCPCS | Performed by: INTERNAL MEDICINE

## 2020-12-02 PROCEDURE — 4040F PNEUMOC VAC/ADMIN/RCVD: CPT | Performed by: INTERNAL MEDICINE

## 2020-12-02 PROCEDURE — 1123F ACP DISCUSS/DSCN MKR DOCD: CPT | Performed by: INTERNAL MEDICINE

## 2020-12-02 PROCEDURE — 1036F TOBACCO NON-USER: CPT | Performed by: INTERNAL MEDICINE

## 2020-12-02 PROCEDURE — G8427 DOCREV CUR MEDS BY ELIG CLIN: HCPCS | Performed by: INTERNAL MEDICINE

## 2020-12-02 PROCEDURE — G8484 FLU IMMUNIZE NO ADMIN: HCPCS | Performed by: INTERNAL MEDICINE

## 2020-12-02 PROCEDURE — 99214 OFFICE O/P EST MOD 30 MIN: CPT | Performed by: INTERNAL MEDICINE

## 2020-12-02 NOTE — Clinical Note
Clau Catherine, Hope you are well. I saw Mr. Vandana Garnett today and he was accompanied by his wife. She is really concerned about his memory and feels like his short-term memory is rapidly decreasing. She states that he has a family history of dementia with his sister having an onset similar to the patient. He, the patient, denies any significant problems. He also had some headache in his posterior head that he describes sharp but subsequently resolved. Cardiac status has been stable. I suggested that they discuss with you first to see if you had any ideas. Not sure whether neurology has a role in this or not. Let me know if I could be of any help.     Tye Bustamante

## 2020-12-02 NOTE — PROGRESS NOTES
[address]    2020     Mohit Man (:  1940) is a 67 y.o. male is here for follow-up of management of CAD and modifiable risk factors. Additionally his wife is concerned about headaches and memory loss. Referring Provider: Annie Peña MD    HISTORY:  Mr. Kavitha Keyes has a history of CAD, s/p RHETT of RCA x 3 and Lcx x 1. He had borderline disease in the distal LAD and Diagonal which has been treated medically. Additional history includes HTN and hyperlipidemia. Today, he denies chest discomfort, shortness of breath or palpitations. He has had 2-4 episodes of brief lightheadedness lasting 30 seconds and resolving on its own. Tends to occur in the morning when he is standing. He works part-time as a [employer] and has not noted the lightheadedness with working which involves prolonged standing. He is noted sharp pain in his posterior head and left neck for the past 2 to 3 days but then resolved on its own. His wife is here and is very concerned about his short-term memory and whether he has dementia. She states that he has a family history of dementia including one of his sisters who had similar symptoms. She states that his memory has been calm progressive to the point where he forgets where he is driven to or needs to drive to and also difficulty remembering questions that he is asked, and answered, within a very short period of time. REVIEW OF SYSTEMS:  A complete review of systems has been reviewed and updated today and is negative except as noted in the history of present illness. Prior to Visit Medications    Medication Sig Taking?  Authorizing Provider   clopidogrel (PLAVIX) 75 MG tablet TAKE 1 TABLET EVERY DAY  Juan Miguel Goldstein MD   lisinopril (PRINIVIL;ZESTRIL) 10 MG tablet TAKE 1 TABLET EVERY DAY  Juna Miguel Goldstein MD   atorvastatin (LIPITOR) 80 MG tablet TAKE 1 TABLET EVERY DAY  Juan Miguel Goldstein MD   nitroGLYCERIN (NITROSTAT) 0.4 MG SL tablet PLACE 1 No wheezing or rales. Abdominal:      General: Bowel sounds are normal.      Palpations: Abdomen is soft. Tenderness: There is no abdominal tenderness. Musculoskeletal: Normal range of motion. Skin:     General: Skin is warm and dry. Findings: No rash. Neurological:      General: No focal deficit present. Mental Status: He is alert and oriented to person, place, and time. Psychiatric:         Mood and Affect: Mood normal.         Behavior: Behavior normal.         Thought Content: Thought content normal.         Judgment: Judgment normal.           I have reviewed all pertinent lab results and diagnostic testing. Lab Results   Component Value Date    CHOL 137 02/21/2018    TRIG 67 01/22/2020    HDL 51 01/22/2020    LDLCALC 59 01/22/2020     Lab Results   Component Value Date     01/22/2020    K 4.0 01/22/2020     01/22/2020    CO2 31 01/22/2020    GLUCOSE 99 01/22/2020    BUN 20 01/22/2020    CREATININE 1.2 02/21/2018    CALCIUM 8.7 01/22/2020    GFRAA > 60 01/22/2020     Lab Results   Component Value Date    ALT 31 01/22/2020    AST 22 01/22/2020       Myoview GXT 9/5/18:      Summary    There is normal isotope uptake at stress and rest. There is no evidence of    myocardial ischemia or scar.    Normal LV function.    Overall findings represent a low risk scan.           Myoview stress test 8/17/16:  Summary       No EKG evidence for ischemia with exercise       Preserved LV systolic function.   LVEF 65%       Myocardial perfusion imaging with no evidence for ischemia with exercise       Excessive motion noted with imaging which may impact diagnostic accuracy.             Cardiac Cath 5/18/16:  Anatomy:   LM-normal  LAD-50% mid, 70% distal at the apex  D1-50% ostial, 70% proximal  Cx-99% mid with left to left collaterals  RCA-70% mid long, 99% distal hazy  RPDA- patent  LVEF- 50 % with inferior hypokinesis, no significant mitral regurgitation  PCI: Circumflex 99% to 0% mid

## 2020-12-02 NOTE — LETTER
[address] Teresa Villareal [address]. [address]  Phone: [phone]  Fax: [phone]    Juan David Clarke MD        December 3, 2020     Neeta Holley, [address] [address], [address] [place] [address]    Patient: Toy Wood  MR Number: [id number]  YOB: 1940  Date of Visit: 2020    Dear Dr. Neeta Holley:    Below are the relevant portions of my assessment and plan of care. [address]    2020     Toy Wood (:  1940) is a 67 y.o. male is here for follow-up of management of CAD and modifiable risk factors. Additionally his wife is concerned about headaches and memory loss. Referring Provider: Neeta Holley MD    HISTORY:  Mr. Eliz Schrader has a history of CAD, s/p RHETT of RCA x 3 and Lcx x 1. He had borderline disease in the distal LAD and Diagonal which has been treated medically. Additional history includes HTN and hyperlipidemia. Today, he denies chest discomfort, shortness of breath or palpitations. He has had 2-4 episodes of brief lightheadedness lasting 30 seconds and resolving on its own. Tends to occur in the morning when he is standing. He works part-time as a [employer] and has not noted the lightheadedness with working which involves prolonged standing. He is noted sharp pain in his posterior head and left neck for the past 2 to 3 days but then resolved on its own. His wife is here and is very concerned about his short-term memory and whether he has dementia. She states that he has a family history of dementia including one of his sisters who had similar symptoms. She states that his memory has been calm progressive to the point where he forgets where he is driven to or needs to drive to and also difficulty remembering questions that he is asked, and answered, within a very short period of time.     REVIEW OF SYSTEMS:  A complete review of systems has been reviewed and updated today and is Extraocular Movements: Extraocular movements intact. Conjunctiva/sclera: Conjunctivae normal.   Neck:      Musculoskeletal: Normal range of motion and neck supple. Vascular: No JVD. Cardiovascular:      Rate and Rhythm: Normal rate and regular rhythm. Heart sounds: Normal heart sounds. No murmur. No friction rub. No gallop. Pulmonary:      Effort: Pulmonary effort is normal. No respiratory distress. Breath sounds: Normal breath sounds. No wheezing or rales. Abdominal:      General: Bowel sounds are normal.      Palpations: Abdomen is soft. Tenderness: There is no abdominal tenderness. Musculoskeletal: Normal range of motion. Skin:     General: Skin is warm and dry. Findings: No rash. Neurological:      General: No focal deficit present. Mental Status: He is alert and oriented to person, place, and time. Psychiatric:         Mood and Affect: Mood normal.         Behavior: Behavior normal.         Thought Content: Thought content normal.         Judgment: Judgment normal.           I have reviewed all pertinent lab results and diagnostic testing. Lab Results   Component Value Date    CHOL 137 02/21/2018    TRIG 67 01/22/2020    HDL 51 01/22/2020    LDLCALC 59 01/22/2020     Lab Results   Component Value Date     01/22/2020    K 4.0 01/22/2020     01/22/2020    CO2 31 01/22/2020    GLUCOSE 99 01/22/2020    BUN 20 01/22/2020    CREATININE 1.2 02/21/2018    CALCIUM 8.7 01/22/2020    GFRAA > 60 01/22/2020     Lab Results   Component Value Date    ALT 31 01/22/2020    AST 22 01/22/2020       Myoview GXT 9/5/18:      Summary    There is normal isotope uptake at stress and rest. There is no evidence of    myocardial ischemia or scar.    Normal LV function.    Overall findings represent a low risk scan.           Myoview stress test 8/17/16:  Summary       No EKG evidence for ischemia with exercise       Preserved LV systolic function.   LVEF 65%     -  1/22/20 -- Chol - 122, TG- 67, HDL- 51, LDL- 59. Liver enzymes normal.    -  treated with Lipitor 80 mg daily    Plan: Stable. Continue current statin dosing. 4.  Fatigue  -  sleeps poorly at night. Referred to Dr. Aspen Art, patient patient not interested in sleep evaluation. - 2/27/19 TSH 2.1     Plan : Naps a lot when not working per his wife. 5.  Memory    Plan: Progressive per wife. Patient does not think that he has much problem with it. Apparently does have family history of dementia. Recommend to him that he discuss with Dr. River Tinajero. Also that potentially a neurology opinion might be needed but that they should discuss with Dr. River Tinajero first.      Plan:  1. Cardiac status overall stable. 2.  Refer to Dr. River Tinajero for further evaluation of memory and headache. 3.  Encouraged increasing aerobic activity with walking. 4.  RTC in 1 year. Scribe's attestation: This note was scribed in the presence of Juan Miguel Goldstein M.D. by Mel Marino RN    Physician Attestation: The scribe's documentation has been prepared under my direction and personally reviewed by me in its entirety. I confirm that the note above accurately reflects all work, treatment, procedures, and medical decision making performed by me. An  electronic signature was used to authenticate this note. Elston Gallant Severiano Monarch, MD, Laura Chino      Can you schedule him to come in for eval  Needs to get cmp, cbc, b12, folate, tsh, lipid before visit  Dx: memory deficit, CAD,     If you have questions, please do not hesitate to call me. I look forward to following Koki Nguyen along with you.     Sincerely,        Juan Miguel Goldstein MD

## 2020-12-02 NOTE — PATIENT INSTRUCTIONS
1. Check blood pressure when during episodes of bad headache  2. Increase activity, take walks outside (weather permitting)  3. Make appointment with Dr. Adelina Beebe to discuss short term memory issues  4.   Follow up in one year

## 2020-12-03 NOTE — PROGRESS NOTES
Can you schedule him to come in for eval  Needs to get cmp, cbc, b12, folate, tsh, lipid before visit  Dx: memory deficit, CAD,

## 2020-12-04 NOTE — PROGRESS NOTES
Patient notified orders are at the  for . He stated he will be going to [facility]. Pt is scheduled for a follow up as well.

## 2020-12-07 LAB
A/G RATIO: 0.7 (ref 1–2)
ALBUMIN SERPL-MCNC: 3.2 G/DL (ref 3.4–5)
ALBUMIN/PROTEIN TOTAL, SER/PL: 7.6 G/DL (ref 6.4–8.2)
ALP BLD-CCNC: 99 U/L (ref 45–117)
ALT SERPL-CCNC: 28 U/L (ref 12–78)
ANION GAP 4: 7 MMOL/L (ref 7–16)
AST W/O P-5'-P: 27 U/L (ref 15–37)
BILIRUB SERPL-MCNC: 0.9 MG/DL (ref 0.2–1)
BUN BLDV-MCNC: 21 MG/DL (ref 7–18)
CALCIUM SERPL-MCNC: 8.5 MG/DL (ref 8.5–10.1)
CHLORIDE BLD-SCNC: 105 MMOL/L (ref 98–107)
CHOLESTEROL, STONE: 100 MG/DL
CO2: 25 MMOL/L (ref 21–32)
CREATININE + EGFR PANEL: 1.4 MG/DL (ref 0.6–1.3)
FOLATE: 15.55 NG/ML (ref 5.9–24.8)
GFR CALCULATED: 49 ML/MIN/1.73M2
GFR CALCULATED: 59 ML/MIN/1.73M2
GLOBULIN: 4.4 G/DL (ref 2.6–4.2)
GLUCOSE: 126 MG/DL (ref 74–106)
HDLC SERPL-MCNC: 31 MG/DL (ref 40–60)
HEMOGLOBIN URINE, QUAL: 15.6 G/DL (ref 13.1–17.6)
LDL CHOLESTEROL CALCULATED: 58 MG/DL (ref 0–99)
MCH RBC QN AUTO: 31.3 PG (ref 28.4–33.4)
MCHC RBC AUTO-ENTMCNC: 33.6 G/DL (ref 31.1–37)
MCV RBC AUTO: 93.1 FL (ref 85–99)
PDW BLD-RTO: 13.5 % (ref 11.7–15.2)
PLATELET COUNT MANUAL: 220 K/UL (ref 154–393)
POTASSIUM SERPL-SCNC: 3.8 MMOL/L (ref 3.5–5.1)
RBC # BLD: 4.99 M/UL (ref 4.3–5.86)
SODIUM BLD-SCNC: 137 MMOL/L (ref 136–145)
SR HEMATOCRIT: 46.5 % (ref 39–51.5)
TRIGL SERPL-MCNC: 54 MG/DL
TSH ULTRASENSITIVE: 1.77 UIU/ML (ref 0.36–3.74)
VITAMIN B-12: 406 PG/ML (ref 180–914)
VLDLC SERPL CALC-MCNC: 11 MG/DL (ref 0–40)
WBC BLOOD, MANUAL: 6 K/UL (ref 4–10.5)

## 2020-12-09 ENCOUNTER — OFFICE VISIT (OUTPATIENT)
Dept: PRIMARY CARE CLINIC | Age: 80
End: 2020-12-09
Payer: MEDICARE

## 2020-12-09 ENCOUNTER — OFFICE VISIT (OUTPATIENT)
Dept: FAMILY MEDICINE CLINIC | Age: 80
End: 2020-12-09
Payer: MEDICARE

## 2020-12-09 ENCOUNTER — NURSE TRIAGE (OUTPATIENT)
Dept: OTHER | Facility: CLINIC | Age: 80
End: 2020-12-09

## 2020-12-09 VITALS — OXYGEN SATURATION: 95 % | HEART RATE: 82 BPM | TEMPERATURE: 100.7 F

## 2020-12-09 PROCEDURE — 99213 OFFICE O/P EST LOW 20 MIN: CPT | Performed by: PHYSICIAN ASSISTANT

## 2020-12-09 PROCEDURE — 99211 OFF/OP EST MAY X REQ PHY/QHP: CPT | Performed by: NURSE PRACTITIONER

## 2020-12-09 PROCEDURE — G8417 CALC BMI ABV UP PARAM F/U: HCPCS | Performed by: NURSE PRACTITIONER

## 2020-12-09 PROCEDURE — G8428 CUR MEDS NOT DOCUMENT: HCPCS | Performed by: NURSE PRACTITIONER

## 2020-12-09 PROCEDURE — G8427 DOCREV CUR MEDS BY ELIG CLIN: HCPCS | Performed by: PHYSICIAN ASSISTANT

## 2020-12-09 PROCEDURE — 4040F PNEUMOC VAC/ADMIN/RCVD: CPT | Performed by: PHYSICIAN ASSISTANT

## 2020-12-09 PROCEDURE — 1123F ACP DISCUSS/DSCN MKR DOCD: CPT | Performed by: PHYSICIAN ASSISTANT

## 2020-12-09 PROCEDURE — 1036F TOBACCO NON-USER: CPT | Performed by: PHYSICIAN ASSISTANT

## 2020-12-09 PROCEDURE — G8484 FLU IMMUNIZE NO ADMIN: HCPCS | Performed by: PHYSICIAN ASSISTANT

## 2020-12-09 PROCEDURE — G8417 CALC BMI ABV UP PARAM F/U: HCPCS | Performed by: PHYSICIAN ASSISTANT

## 2020-12-09 ASSESSMENT — ENCOUNTER SYMPTOMS
WHEEZING: 0
SORE THROAT: 0
VOMITING: 0
SINUS PRESSURE: 1
SHORTNESS OF BREATH: 0
RHINORRHEA: 1
NAUSEA: 0
COUGH: 1
EYE PAIN: 0
DIARRHEA: 0

## 2020-12-09 NOTE — TELEPHONE ENCOUNTER
Reason for Disposition   Sinus pain (not just congestion) and fever    Answer Assessment - Initial Assessment Questions  1. LOCATION: \"Where does it hurt? \"       no  2. ONSET: \"When did the sinus pain start? \"  (e.g., hours, days)       No pain - started yesterday   3. SEVERITY: \"How bad is the pain? \"   (Scale 1-10; mild, moderate or severe)    - MILD (1-3): doesn't interfere with normal activities     - MODERATE (4-7): interferes with normal activities (e.g., work or school) or awakens from sleep    - SEVERE (8-10): excruciating pain and patient unable to do any normal activities         6/10  4. RECURRENT SYMPTOM: \"Have you ever had sinus problems before? \" If so, ask: \"When was the last time? \" and \"What happened that time? \"       Yes chronic   5. NASAL CONGESTION: \"Is the nose blocked? \" If so, ask, \"Can you open it or must you breathe through the mouth? \"      no  6. NASAL DISCHARGE: \"Do you have discharge from your nose? \" If so ask, \"What color? \"     Clear   7. FEVER: \"Do you have a fever? \" If so, ask: \"What is it, how was it measured, and when did it start? \"       101 last night   8. OTHER SYMPTOMS: \"Do you have any other symptoms? \" (e.g., sore throat, cough, earache, difficulty breathing)      Cough   9. PREGNANCY: \"Is there any chance you are pregnant? \" \"When was your last menstrual period? \"      no    Protocols used: SINUS PAIN OR CONGESTION-ADULT-OH    Patient called pre-service center [facility]) [address] with red flag complaint. Brief description of triage: pt reports fever of 101 last night and sinus congestion and     Triage indicates for patient to see pcp today     Care advice provided, patient verbalizes understanding; denies any other questions or concerns; instructed to call back for any new or worsening symptoms. Writer provided warm transfer t at [facility] for appointment scheduling. Attention Provider: Thank you for allowing me to participate in the care of your patient.  The patient was connected to triage in response to information provided to the E[facility]. Please do not respond through this encounter as the response is not directed to a shared pool.

## 2020-12-09 NOTE — PROGRESS NOTES
Subjective:      Patient ID: Lester Craven is a 67 y.o. male. HPI Patient is here with a 2 day history runny/stuffy nose, productive cough, mild body aches. He works in a [employer] shop. He denies chills, ST, SOB, n/v/d, loss of taste or smell, HA's. Wife says he had a fever of 101 last night. No covid contacts that he knows of, no recent travel. He took a multi symptom medicine otc. His appetite has decreased a little bit. Review of Systems   Constitutional: Positive for fatigue. Negative for appetite change, chills and fever. HENT: Positive for congestion, rhinorrhea and sinus pressure. Negative for ear pain and sore throat. Eyes: Negative for pain. Respiratory: Positive for cough. Negative for shortness of breath and wheezing. Gastrointestinal: Negative for diarrhea, nausea and vomiting. Musculoskeletal: Positive for myalgias (mild). Skin: Negative for rash. Neurological: Negative for dizziness and headaches. Objective:   Physical Exam  Vitals signs reviewed. Constitutional:       General: He is not in acute distress. Appearance: Normal appearance. He is well-developed and well-groomed. HENT:      Head: Normocephalic. Right Ear: Tympanic membrane and ear canal normal.      Left Ear: Tympanic membrane and ear canal normal.      Nose: Mucosal edema and congestion present. No rhinorrhea. Right Sinus: No maxillary sinus tenderness or frontal sinus tenderness. Left Sinus: No maxillary sinus tenderness or frontal sinus tenderness. Mouth/Throat:      Mouth: Mucous membranes are moist.      Pharynx: Oropharynx is clear. Uvula midline. Neck:      Musculoskeletal: Neck supple. Cardiovascular:      Rate and Rhythm: Normal rate and regular rhythm. Heart sounds: Normal heart sounds. Pulmonary:      Effort: Pulmonary effort is normal. No respiratory distress. Breath sounds: Normal breath sounds. Lymphadenopathy:      Cervical: No cervical adenopathy. Skin:     General: Skin is warm and dry. Findings: No rash. Neurological:      Mental Status: He is alert and oriented to person, place, and time. Psychiatric:         Attention and Perception: Attention normal.         Mood and Affect: Mood normal.         Speech: Speech normal.         Behavior: Behavior normal. Behavior is cooperative. Assessment:        Diagnoses and all orders for this visit:    Fever, unspecified fever cause    Cough         Plan:      Treat symptomatically, quarantine until you hear from us, treat symptomatically.         [facility] [place], [address]

## 2020-12-09 NOTE — PROGRESS NOTES
[place] [facility] received a viral test for COVID-19. They were educated on isolation and quarantine as appropriate. For any symptoms, they were directed to seek care from their PCP, given contact information to establish with a doctor, directed to an urgent care or the emergency room.

## 2020-12-10 ENCOUNTER — TELEPHONE (OUTPATIENT)
Dept: FAMILY MEDICINE CLINIC | Age: 80
End: 2020-12-10

## 2020-12-10 LAB — SARS-COV-2, NAA: DETECTED

## 2020-12-10 NOTE — TELEPHONE ENCOUNTER
Patient was seen yesterday for red visit with Umair Baer and did not get any medications but this morning, he is now congested, feeling bad and would like something sent to the pharmacy. They have tried delsym and other OTC meds but they are not helping.         [address] Leanna Fay [phone]      Provider out of office    Please advise

## 2020-12-11 ENCOUNTER — TELEPHONE (OUTPATIENT)
Dept: FAMILY MEDICINE CLINIC | Age: 80
End: 2020-12-11

## 2020-12-28 ENCOUNTER — TELEPHONE (OUTPATIENT)
Dept: FAMILY MEDICINE CLINIC | Age: 80
End: 2020-12-28

## 2020-12-28 NOTE — TELEPHONE ENCOUNTER
Memory [address], [place]  MD Cherry Maza MD Arvmegan Alex, [address] [facility]    [phone] (Work)  [facility] [address], [address] [place] [address] at [facility]  [address] L[address], [address] [facility]  [address], Psy.D  [address]  Dr Bharath Morales [facility]  [place], [address]     [phone]?

## 2021-01-04 ENCOUNTER — OFFICE VISIT (OUTPATIENT)
Dept: FAMILY MEDICINE CLINIC | Age: 81
End: 2021-01-04
Payer: MEDICARE

## 2021-01-04 VITALS
TEMPERATURE: 97.1 F | HEIGHT: 67 IN | WEIGHT: 184.6 LBS | BODY MASS INDEX: 28.97 KG/M2 | SYSTOLIC BLOOD PRESSURE: 100 MMHG | DIASTOLIC BLOOD PRESSURE: 56 MMHG | OXYGEN SATURATION: 98 % | HEART RATE: 78 BPM

## 2021-01-04 DIAGNOSIS — F03.90 DEMENTIA WITHOUT BEHAVIORAL DISTURBANCE, UNSPECIFIED DEMENTIA TYPE: Primary | ICD-10-CM

## 2021-01-04 PROCEDURE — G8484 FLU IMMUNIZE NO ADMIN: HCPCS | Performed by: FAMILY MEDICINE

## 2021-01-04 PROCEDURE — 1123F ACP DISCUSS/DSCN MKR DOCD: CPT | Performed by: FAMILY MEDICINE

## 2021-01-04 PROCEDURE — 4040F PNEUMOC VAC/ADMIN/RCVD: CPT | Performed by: FAMILY MEDICINE

## 2021-01-04 PROCEDURE — 99214 OFFICE O/P EST MOD 30 MIN: CPT | Performed by: FAMILY MEDICINE

## 2021-01-04 PROCEDURE — 1036F TOBACCO NON-USER: CPT | Performed by: FAMILY MEDICINE

## 2021-01-04 PROCEDURE — G8417 CALC BMI ABV UP PARAM F/U: HCPCS | Performed by: FAMILY MEDICINE

## 2021-01-04 PROCEDURE — G8427 DOCREV CUR MEDS BY ELIG CLIN: HCPCS | Performed by: FAMILY MEDICINE

## 2021-01-04 RX ORDER — DONEPEZIL HYDROCHLORIDE 10 MG/1
10 TABLET, FILM COATED ORAL NIGHTLY
Qty: 90 TABLET | Refills: 3 | Status: SHIPPED | OUTPATIENT
Start: 2021-01-04 | End: 2021-08-09 | Stop reason: SDUPTHER

## 2021-01-04 RX ORDER — DONEPEZIL HYDROCHLORIDE 5 MG/1
5 TABLET, FILM COATED ORAL NIGHTLY
Qty: 30 TABLET | Refills: 0 | Status: SHIPPED | OUTPATIENT
Start: 2021-01-04 | End: 2021-02-08

## 2021-01-04 ASSESSMENT — PATIENT HEALTH QUESTIONNAIRE - PHQ9
SUM OF ALL RESPONSES TO PHQ9 QUESTIONS 1 & 2: 0
SUM OF ALL RESPONSES TO PHQ QUESTIONS 1-9: 0
2. FEELING DOWN, DEPRESSED OR HOPELESS: 0
SUM OF ALL RESPONSES TO PHQ QUESTIONS 1-9: 0
SUM OF ALL RESPONSES TO PHQ QUESTIONS 1-9: 0
1. LITTLE INTEREST OR PLEASURE IN DOING THINGS: 0

## 2021-01-04 NOTE — PROGRESS NOTES
Chief Complaint   Patient presents with    Memory Loss     follow up     Wife States has noted slow decrease in memory but has been getting worse. Had [place] in early December and wife states has gotten worse since then. Has recovered from covid with no lasting sx. When someome brings up he had covid he denies and states he tested negative. Pt ruminated about his driving after we discussed that he should not drive alone. Wife reports has gotten lost several times and yesterday driving to the [facility] they always go to that is a mile from their home he asked several times where they were going and how to get there. Wife states he sometimes gets \"impatient\" at red lights and stop signs but does not run them. Seems to understand the rules of the road ok. Wife does most of the bill paying now. Pt still goes into work ([employer]) occasionally but not often. Vitals:    01/04/21 1445   BP: (!) 100/56   Site: Left Upper Arm   Position: Sitting   Cuff Size: Large Adult   Pulse: 78   Temp: 97.1 °F (36.2 °C)   TempSrc: Infrared   SpO2: 98%   Weight: 184 lb 9.6 oz (83.7 kg)   Height: 5' 7\" (1.702 m)     Wt Readings from Last 3 Encounters:   01/04/21 184 lb 9.6 oz (83.7 kg)   12/02/20 188 lb (85.3 kg)   05/13/20 189 lb 6.4 oz (85.9 kg)     Body mass index is 28.91 kg/m². PHQ Scores 1/4/2021 2/19/2020 1/21/2020 4/11/2018 11/30/2016   PHQ2 Score 0 0 0 0 0   PHQ9 Score 0 0 0 0 0           GEN: Alert and oriented x 4 NAD, affect appropriate and overweight, well hydrated, well developed. Repeats things over and over, gets stuck on topics   No tremor noted, normal gate and facial movements    See MOCA scanned to chart (18/30)        ASSESSMENT AND PLAN:       Gonzalez Kingsley was seen today for memory loss. Diagnoses and all orders for this visit:    Dementia without behavioral disturbance, unspecified dementia type ([facility] [address]  -     CT HEAD WO CONTRAST;  Future  -     External Referral To Neurology    Other orders  -     donepezil (ARICEPT) 5 MG tablet; Take 1 tablet by mouth nightly  -     donepezil (ARICEPT) 10 MG tablet; Take 1 tablet by mouth nightly      Discussed results of MOCA   Discussed more formal eval and names given for places they can go  Get CT head to complete workup  Reviewed recent labs with patient that were normal  Discussed various types of dementia, discussed Alzheimers most common and more common with age  Discussed meds and that they slow progression and keep people functional and home longer but do not usually cause improvement  Discussed meds only effective with Alzheimers  Discussed SE of Aricept  Start 5mg x 1 month and then increase to 10mg        Return in about 3 months (around 4/4/2021) for Follow up, 30 min.

## 2021-01-04 NOTE — PATIENT INSTRUCTIONS
Memory [address], [place]  MD Jordana Jimenez MD  Baljinder Sires, [address] [facility]    [phone] (Work)  [facility] [address], [address] [place] [address] at [facility]  [address] L[address], [address] [facility]  [address], Psy.D  [address] [facility] [address] Dr Bharath Morales [facility]  [place], [address]     [phone]?

## 2021-01-08 ENCOUNTER — HOSPITAL ENCOUNTER (OUTPATIENT)
Dept: CT IMAGING | Age: 81
Discharge: HOME | End: 2021-01-08
Payer: MEDICARE

## 2021-01-08 DIAGNOSIS — F03.90 DEMENTIA WITHOUT BEHAVIORAL DISTURBANCE, UNSPECIFIED DEMENTIA TYPE: ICD-10-CM

## 2021-01-08 PROCEDURE — 70450 CT HEAD/BRAIN W/O DYE: CPT

## 2021-01-11 ENCOUNTER — TELEPHONE (OUTPATIENT)
Dept: CARDIOLOGY CLINIC | Age: 81
End: 2021-01-11

## 2021-01-11 ENCOUNTER — TELEPHONE (OUTPATIENT)
Dept: FAMILY MEDICINE CLINIC | Age: 81
End: 2021-01-11

## 2021-01-11 NOTE — TELEPHONE ENCOUNTER
Asking if [facility] could review the CT scan he had at [facility] on 1/8/21. His pcp started him on aricept for his dementia . Wife states his dementia has gotten 10x worse since having covid the first week on December . Wife is asking that [facility] review test to let her know if there is anything that can be done for him ?  Please call wife after [facility] looks at testing

## 2021-01-12 NOTE — TELEPHONE ENCOUNTER
Dr. Adonay Cheema reviewed CT results that showed some age appropriate changes, but his COVID illness and the stress related acute illness may be contributing to worsening memory. He agrees with addition of Aricept started by Dr. Oly Zapien. He will defer question of additional testing to Dr. Oly Zapien, but does not think testing would change treatment plan. This was discussed with wife. All questions addressed and answered. Wife verbalized understanding.

## 2021-02-02 ENCOUNTER — TELEPHONE (OUTPATIENT)
Dept: FAMILY MEDICINE CLINIC | Age: 81
End: 2021-02-02

## 2021-02-02 NOTE — TELEPHONE ENCOUNTER
Patient states he woke up this morning feeling dizzy and stumbling, he tried eating something and it didn't help. He thinks it might be the new medication Aricept but is not sure, he would like a call back.     Please advise [phone]

## 2021-02-02 NOTE — TELEPHONE ENCOUNTER
Wife called back and states she wants someone to call the patient as soon as possible about his symptoms and what Dr Sandra Carrasquillo would like for him to do. Patient will not listen to her.

## 2021-02-02 NOTE — TELEPHONE ENCOUNTER
If dizzy and stumbling he needs to go to ED to be checked.     We started med a month ago so not convinced that is problem

## 2021-02-03 ENCOUNTER — APPOINTMENT (OUTPATIENT)
Dept: CT IMAGING | Age: 81
End: 2021-02-03
Payer: MEDICARE

## 2021-02-03 ENCOUNTER — HOSPITAL ENCOUNTER (EMERGENCY)
Age: 81
Discharge: HOME | End: 2021-02-03
Attending: EMERGENCY MEDICINE
Payer: MEDICARE

## 2021-02-03 ENCOUNTER — APPOINTMENT (OUTPATIENT)
Dept: GENERAL RADIOLOGY | Age: 81
End: 2021-02-03
Payer: MEDICARE

## 2021-02-03 VITALS
SYSTOLIC BLOOD PRESSURE: 151 MMHG | RESPIRATION RATE: 18 BRPM | OXYGEN SATURATION: 96 % | HEIGHT: 68 IN | HEART RATE: 67 BPM | BODY MASS INDEX: 27.28 KG/M2 | TEMPERATURE: 99 F | DIASTOLIC BLOOD PRESSURE: 85 MMHG | WEIGHT: 180 LBS

## 2021-02-03 DIAGNOSIS — R26.9 ABNORMAL GAIT: Primary | ICD-10-CM

## 2021-02-03 LAB
A/G RATIO: 1.3 (ref 1.1–2.2)
ALBUMIN SERPL-MCNC: 4.2 G/DL (ref 3.4–5)
ALP BLD-CCNC: 101 U/L (ref 40–129)
ALT SERPL-CCNC: 27 U/L (ref 10–40)
ANION GAP SERPL CALCULATED.3IONS-SCNC: 10 MMOL/L (ref 3–16)
APTT: 32.9 SEC (ref 24.2–36.2)
AST SERPL-CCNC: 27 U/L (ref 15–37)
BASOPHILS ABSOLUTE: 0.1 K/UL (ref 0–0.2)
BASOPHILS RELATIVE PERCENT: 1.4 %
BILIRUB SERPL-MCNC: 0.7 MG/DL (ref 0–1)
BILIRUBIN URINE: NEGATIVE
BLOOD, URINE: NEGATIVE
BUN BLDV-MCNC: 19 MG/DL (ref 7–20)
CALCIUM SERPL-MCNC: 9.5 MG/DL (ref 8.3–10.6)
CHLORIDE BLD-SCNC: 107 MMOL/L (ref 99–110)
CLARITY: ABNORMAL
CO2: 24 MMOL/L (ref 21–32)
COLOR: YELLOW
CREAT SERPL-MCNC: 1.1 MG/DL (ref 0.8–1.3)
EOSINOPHILS ABSOLUTE: 0.1 K/UL (ref 0–0.6)
EOSINOPHILS RELATIVE PERCENT: 1.1 %
EPITHELIAL CELLS, UA: 1 /HPF (ref 0–5)
GFR AFRICAN AMERICAN: >60
GFR NON-AFRICAN AMERICAN: >60
GLOBULIN: 3.2 G/DL
GLUCOSE BLD-MCNC: 120 MG/DL (ref 70–99)
GLUCOSE URINE: NEGATIVE MG/DL
HCT VFR BLD CALC: 50.3 % (ref 40.5–52.5)
HEMOGLOBIN: 16.2 G/DL (ref 13.5–17.5)
HYALINE CASTS: 0 /LPF (ref 0–8)
INR BLD: 1.03 (ref 0.86–1.14)
KETONES, URINE: NEGATIVE MG/DL
LEUKOCYTE ESTERASE, URINE: NEGATIVE
LYMPHOCYTES ABSOLUTE: 2.4 K/UL (ref 1–5.1)
LYMPHOCYTES RELATIVE PERCENT: 30.1 %
MCH RBC QN AUTO: 30.8 PG (ref 26–34)
MCHC RBC AUTO-ENTMCNC: 32.3 G/DL (ref 31–36)
MCV RBC AUTO: 95.6 FL (ref 80–100)
MICROSCOPIC EXAMINATION: YES
MONOCYTES ABSOLUTE: 0.6 K/UL (ref 0–1.3)
MONOCYTES RELATIVE PERCENT: 7.9 %
NEUTROPHILS ABSOLUTE: 4.8 K/UL (ref 1.7–7.7)
NEUTROPHILS RELATIVE PERCENT: 59.5 %
NITRITE, URINE: NEGATIVE
PDW BLD-RTO: 14.7 % (ref 12.4–15.4)
PH UA: 5 (ref 5–8)
PLATELET # BLD: 316 K/UL (ref 135–450)
PMV BLD AUTO: 7.5 FL (ref 5–10.5)
POTASSIUM REFLEX MAGNESIUM: 4.2 MMOL/L (ref 3.5–5.1)
PRO-BNP: 246 PG/ML (ref 0–449)
PROTEIN UA: ABNORMAL MG/DL
PROTHROMBIN TIME: 11.9 SEC (ref 10–13.2)
RBC # BLD: 5.26 M/UL (ref 4.2–5.9)
RBC UA: 3 /HPF (ref 0–4)
SODIUM BLD-SCNC: 141 MMOL/L (ref 136–145)
SPECIFIC GRAVITY UA: >1.03 (ref 1–1.03)
TOTAL PROTEIN: 7.4 G/DL (ref 6.4–8.2)
TROPONIN: <0.01 NG/ML
URINE REFLEX TO CULTURE: ABNORMAL
URINE TYPE: ABNORMAL
UROBILINOGEN, URINE: 0.2 E.U./DL
WBC # BLD: 8 K/UL (ref 4–11)
WBC UA: 2 /HPF (ref 0–5)

## 2021-02-03 PROCEDURE — 6370000000 HC RX 637 (ALT 250 FOR IP): Performed by: PHYSICIAN ASSISTANT

## 2021-02-03 PROCEDURE — 71045 X-RAY EXAM CHEST 1 VIEW: CPT

## 2021-02-03 PROCEDURE — 84484 ASSAY OF TROPONIN QUANT: CPT

## 2021-02-03 PROCEDURE — 85025 COMPLETE CBC W/AUTO DIFF WBC: CPT

## 2021-02-03 PROCEDURE — 81001 URINALYSIS AUTO W/SCOPE: CPT

## 2021-02-03 PROCEDURE — 83880 ASSAY OF NATRIURETIC PEPTIDE: CPT

## 2021-02-03 PROCEDURE — 99285 EMERGENCY DEPT VISIT HI MDM: CPT

## 2021-02-03 PROCEDURE — 70498 CT ANGIOGRAPHY NECK: CPT

## 2021-02-03 PROCEDURE — 93005 ELECTROCARDIOGRAM TRACING: CPT | Performed by: PHYSICIAN ASSISTANT

## 2021-02-03 PROCEDURE — 6360000004 HC RX CONTRAST MEDICATION: Performed by: PHYSICIAN ASSISTANT

## 2021-02-03 PROCEDURE — 85730 THROMBOPLASTIN TIME PARTIAL: CPT

## 2021-02-03 PROCEDURE — 85610 PROTHROMBIN TIME: CPT

## 2021-02-03 PROCEDURE — 70450 CT HEAD/BRAIN W/O DYE: CPT

## 2021-02-03 PROCEDURE — 80053 COMPREHEN METABOLIC PANEL: CPT

## 2021-02-03 RX ORDER — MECLIZINE HCL 12.5 MG/1
25 TABLET ORAL ONCE
Status: COMPLETED | OUTPATIENT
Start: 2021-02-03 | End: 2021-02-03

## 2021-02-03 RX ORDER — MECLIZINE HYDROCHLORIDE 25 MG/1
25 TABLET ORAL 3 TIMES DAILY PRN
Qty: 30 TABLET | Refills: 0 | Status: SHIPPED | OUTPATIENT
Start: 2021-02-03 | End: 2021-02-13

## 2021-02-03 RX ADMIN — IOPAMIDOL 75 ML: 755 INJECTION, SOLUTION INTRAVENOUS at 18:54

## 2021-02-03 RX ADMIN — MECLIZINE 25 MG: 12.5 TABLET ORAL at 17:09

## 2021-02-03 ASSESSMENT — ENCOUNTER SYMPTOMS
BACK PAIN: 0
VOMITING: 0
RESPIRATORY NEGATIVE: 1
COLOR CHANGE: 0
CONSTIPATION: 0
COUGH: 0
NAUSEA: 0
CHEST TIGHTNESS: 0
PHOTOPHOBIA: 0
ABDOMINAL PAIN: 0
DIARRHEA: 0
SHORTNESS OF BREATH: 0

## 2021-02-03 NOTE — ED PROVIDER NOTES
[address] [facility]        Pt Name: Hao Sorenson  MRN: [id number]  [place] 1940  Date of evaluation: 2/3/2021  Provider: DRAKE Velazquez  PCP: Nicolette King MD     I have seen and evaluated this patient with my supervising physician No att. providers found. CHIEF COMPLAINT       Chief Complaint   Patient presents with    Gait Problem     pt states \"i'm wobbly on my feet\" / weakness- states began yesterday       HISTORY OF PRESENT ILLNESS   (Location, Timing/Onset, Context/Setting, Quality, Duration, Modifying Factors, Severity, Associated Signs and Symptoms)  Note limiting factors. Hao Sorenson is a 79 y.o. male with no significant past medical history who presents to the ED with complaint of a gait problem. Was sent to the ED by PCP for evaluation of gait problem. Patient has some dementia and wife helps with history at this time. Wife states that the began this month they increased his Aricept from 5 mg a day to 10 mg a day. Wife states he was doing well but states yesterday he started having some difficulty with his gait. States when he stands up he appears to be \"wobbly\". Patient states he does not feel steady and feels like he is going to fall over. Does improve as he stays standing. States worsened when he first stands up or changes positions. Patient states he feels weak. Wife states he had Covid at the beginning of last month and states ever since then he has had some decreased oral intake. Denies nausea, vomiting, urinary symptoms or changes in bowel movements. Denies chest pain, shortness of breath, palpitations, pleuritic pain, orthopnea, pedal edema or calf tenderness. Denies fever chills. Denies rashes or lesions. Denies headache, lightheadedness, visual changes, speech disturbances or numbness/tingling. Denies history of CVA or TIA in the past.  Denies any falls or injuries.   Is on Plavix at Details   Multiple Vitamins-Minerals (MULTIVITAMIN ADULT PO) Take by mouthHistorical Med      !! donepezil (ARICEPT) 5 MG tablet Take 1 tablet by mouth nightly, Disp-30 tablet, R-0Normal      !! donepezil (ARICEPT) 10 MG tablet Take 1 tablet by mouth nightly, Disp-90 tablet, R-3Normal      clopidogrel (PLAVIX) 75 MG tablet TAKE 1 TABLET EVERY DAY, Disp-90 tablet,R-3Normal      lisinopril (PRINIVIL;ZESTRIL) 10 MG tablet TAKE 1 TABLET EVERY DAY, Disp-90 tablet,R-3Normal      atorvastatin (LIPITOR) 80 MG tablet TAKE 1 TABLET EVERY DAY, Disp-90 tablet, R-3Normal      nitroGLYCERIN (NITROSTAT) 0.4 MG SL tablet PLACE 1 TABLET UNDER THE TONGUE EVERY 5 MINUTES AS NEEDED FOR CHEST PAIN, Disp-25 tablet, R-3Normal       !! - Potential duplicate medications found. Please discuss with provider. ALLERGIES     Pcn [penicillins]    FAMILYHISTORY       Family History   Problem Relation Age of Onset    Heart Disease Other     Heart Attack Mother     Hypertension Mother     Emphysema Father     Blindness Sister     Stroke Sister     Dementia Sister           SOCIAL HISTORY       Social History     Tobacco Use    Smoking status: Never Smoker    Smokeless tobacco: Never Used   Substance Use Topics    Alcohol use: No     Alcohol/week: 1.0 standard drinks     Types: 1 Cans of beer per week    Drug use: No       SCREENINGS             PHYSICAL EXAM    (up to 7 for level 4, 8 or more for level 5)     ED Triage Vitals [02/03/21 1644]   BP Temp Temp Source Pulse Resp SpO2 Height Weight   (!) 142/73 99 °F (37.2 °C) Oral 80 16 95 % 5' 8\" (1.727 m) 180 lb (81.6 kg)       Physical Exam  Constitutional:       General: He is not in acute distress. Appearance: Normal appearance. He is well-developed. He is not ill-appearing, toxic-appearing or diaphoretic. HENT:      Head: Normocephalic and atraumatic. Comments: Atraumatic. No raccoon eyes or balbuena sign.      Right Ear: External ear normal.      Left Ear: External ear normal.   Eyes:      General:         Right eye: No discharge. Left eye: No discharge. Extraocular Movements: Extraocular movements intact. Conjunctiva/sclera: Conjunctivae normal.      Pupils: Pupils are equal, round, and reactive to light. Neck:      Musculoskeletal: Normal range of motion and neck supple. No neck rigidity or muscular tenderness. Cardiovascular:      Rate and Rhythm: Normal rate and regular rhythm. Pulses: Normal pulses. Heart sounds: Normal heart sounds. No murmur. No friction rub. No gallop. Pulmonary:      Effort: Pulmonary effort is normal. No respiratory distress. Breath sounds: Normal breath sounds. No stridor. No wheezing, rhonchi or rales. Chest:      Chest wall: No tenderness. Abdominal:      General: Abdomen is flat. Bowel sounds are normal. There is no distension. Palpations: Abdomen is soft. There is no mass. Tenderness: There is no abdominal tenderness. There is no right CVA tenderness, left CVA tenderness, guarding or rebound. Hernia: No hernia is present. Musculoskeletal: Normal range of motion. Lymphadenopathy:      Cervical: No cervical adenopathy. Skin:     General: Skin is warm and dry. Coloration: Skin is not pale. Findings: No erythema or rash. Neurological:      General: No focal deficit present. Mental Status: He is alert and oriented to person, place, and time. GCS: GCS eye subscore is 4. GCS verbal subscore is 5. GCS motor subscore is 6. Cranial Nerves: Cranial nerves are intact. No cranial nerve deficit, dysarthria or facial asymmetry. Sensory: Sensation is intact. No sensory deficit. Motor: Motor function is intact. No weakness, tremor or atrophy. Coordination: Coordination is intact. Romberg sign negative. Finger-Nose-Finger Test and Heel to [facility] Test normal.      Gait: Gait abnormal.      Comments: Upon examination patient alert at this time. GCS 15.   Cranial nerves II through XII intact. Speech clear. No facial droop. Visual fields intact. Visual acuity intact at bedside. Negative test of skew. Sensation intact to the upper and lower extremities throughout. Full range of motion and strength to the upper and lower extremities throughout. No focal weakness. No drift or deficit. Patient has normal point-to-point. Normal heel-to-shin. Patient upon ambulation does appear to be slightly wobbly on his feet and does some swaying with initial standing but seems to improve after several seconds. He is able to ambulate here in the ED but does have some slight swaying. Not require assistance.    Psychiatric:         Behavior: Behavior normal.         DIAGNOSTIC RESULTS   LABS:    Labs Reviewed   COMPREHENSIVE METABOLIC PANEL W/ REFLEX TO MG FOR LOW K - Abnormal; Notable for the following components:       Result Value    Glucose 120 (*)     All other components within normal limits    Narrative:     Performed at:  [facility] [address], [place]   Phone [phone]   URINE RT REFLEX TO CULTURE - Abnormal; Notable for the following components:    Clarity, UA CLOUDY (*)     Protein, UA TRACE (*)     All other components within normal limits    Narrative:     Performed at:  [facility] [address], [address]   Phone [phone]   CBC WITH AUTO DIFFERENTIAL    Narrative:     Performed at:  [facility] [address], [address]   Phone [phone]   TROPONIN    Narrative:     Performed at:  [facility] [address], [address]   Phone [phone]   BRAIN NATRIURETIC PEPTIDE    Narrative:     Performed at:  [facility] [address]   Phone [phone]   PROTIME-INR    Narrative:     Performed at:  [facility]  [address], [address]   Phone [phone]   APTT    Narrative:     Performed at:  [facility]  [address] [place],  [place], [address]   Phone [phone]   MICROSCOPIC URINALYSIS    Narrative:     Performed at:  [facility]  [address] [facility],  [place], [address]   Phone [phone]       All other labs were within normal range or not returned as of this dictation. EKG: All EKG's are interpreted by the Emergency Department Physician in the absence of a cardiologist.  Please see their note for interpretation of EKG. RADIOLOGY:   Non-plain film images such as CT, Ultrasound and MRI are read by the radiologist. Plain radiographic images are visualized and preliminarily interpreted by the ED Provider with the below findings:        Interpretation per the Radiologist below, if available at the time of this note:     [facility]   Final Result   1. No acute intracranial abnormality. 2. Atherosclerosis in bilateral carotid bifurcations resulting in   approximately 50% stenosis at the origin of bilateral ICAs. 3. Moderately severe short segment stenosis of the distal right P1 segment. CT HEAD WO CONTRAST   Final Result   1. No acute intracranial abnormality. 2. Atherosclerosis in bilateral carotid bifurcations resulting in   approximately 50% stenosis at the origin of bilateral ICAs. 3. Moderately severe short segment stenosis of the distal right P1 segment. XR CHEST PORTABLE   Final Result   Rotated exam, without gross acute process. No results found.         PROCEDURES   Unless otherwise noted below, none     Procedures    CRITICAL CARE TIME   N/A    CONSULTS:  None      EMERGENCY DEPARTMENT COURSE and DIFFERENTIAL DIAGNOSIS/MDM:   Vitals:    Vitals:    02/03/21 2030 02/03/21 2100 02/03/21 2130 02/03/21 2138   BP: (!) 155/84 (!) 161/86 (!) 151/85 (!) 151/85   Pulse: 64 68 63 67   Resp: 16 11 16 18   Temp:       TempSrc:       SpO2: 96% 97% 97% 96%   Weight:       Height:           Patient was given the following medications:  Medications   meclizine (ANTIVERT) tablet 25 mg (25 mg Oral Given 2/3/21 1709)   iopamidol (ISOVUE-370) 76 % injection 75 mL (75 mLs Intravenous Given 2/3/21 4634)           Patient is a 36-year-old male who presents to the ED with complaint of a problem with his gait. Has been ongoing for the past several days. Just recently had his Aricept increased. Patient upon exam does have slight wobbly gait when he first stands but improves here in the ED. Reassuring neuro examination otherwise. Given meclizine here in the ED with improvement of symptoms per patient report. Urinalysis unremarkable. CBC showed normal white count, hemoglobin and platelets. Troponin normal.  BNP unremarkable. CBC showed glucose 120 otherwise unremarkable. Coags obtained. CT of the head and neck showed no acute abnormality. There is also sclerosis to the bilateral carotid bifurcations resulting in 50% stenosis of the origin of the bilateral ICAs. Moderate severe short segment stenosis of the distal right P1 segment. Chest x-ray unremarkable. EKG interpreted by attending. Patient suffering from abnormal gait. Did offer admission for evaluation with MRI for potential posterior circulation disorder. Patient would like to go home. Will follow up with his PCP. Informed discharge given. Close return precautions given. Able to ambulate out of the emergency department at this time. Will give meclizine for home. Low suspicion for intracranial mass, intracranial hemorrhage, ACS, PE, dissection, AAA, pneumonia, pneumothorax respiratory stress, GERD, surgical abdomen, anxiety, intracranial mass, intracranial hemorrhage, subarachnoid hemorrhage, subdural hematoma, meningitis, encephalitis or other emergent etiology at this time.       FINAL IMPRESSION      1.

## 2021-02-04 ENCOUNTER — TELEPHONE (OUTPATIENT)
Dept: FAMILY MEDICINE CLINIC | Age: 81
End: 2021-02-04

## 2021-02-04 LAB
EKG ATRIAL RATE: 71 BPM
EKG DIAGNOSIS: NORMAL
EKG P AXIS: 36 DEGREES
EKG P-R INTERVAL: 124 MS
EKG Q-T INTERVAL: 390 MS
EKG QRS DURATION: 70 MS
EKG QTC CALCULATION (BAZETT): 423 MS
EKG R AXIS: 44 DEGREES
EKG T AXIS: 72 DEGREES
EKG VENTRICULAR RATE: 71 BPM

## 2021-02-04 PROCEDURE — 93010 ELECTROCARDIOGRAM REPORT: CPT | Performed by: INTERNAL MEDICINE

## 2021-02-04 NOTE — TELEPHONE ENCOUNTER
----- Message from Abycandacefe Stallworth sent at 2/4/2021 12:54 PM EST -----  Subject: Referral Request    QUESTIONS   Reason for referral request? MRI for pt head   having alot of dizziness   Has the physician seen you for this condition before? No   Preferred Specialist (if applicable)? Do you already have an appointment scheduled? Additional Information for Provider?   ---------------------------------------------------------------------------  --------------  CALL BACK INFO  What is the best way for the office to contact you? OK to leave message on   voicemail  Preferred Call Back Phone Number?  [phone]

## 2021-02-04 NOTE — ED NOTES
PT in from home with complaints of balance issues since yesterday. PT wife stated that he couldn't get out of bed this morning without her assistance. PT has Hx of dementia, states that he has not had much gait problems but wife denies this. PT denies CP/SOB at this time. PT placed on telemetry monitor with ST elevation capabilities. Will continue with current plan of care.       Kamilla Avila RN  02/03/21 0610

## 2021-02-05 ENCOUNTER — TELEPHONE (OUTPATIENT)
Dept: FAMILY MEDICINE CLINIC | Age: 81
End: 2021-02-05

## 2021-02-05 NOTE — TELEPHONE ENCOUNTER
----- Message from Kathleen Lam sent at 2/5/2021 11:12 AM EST -----  Subject: Message to Provider    QUESTIONS  Information for Provider? pt called asking about MRI before apt. the dr in   the ER stated that it would need to be done before apt with Dr David Larsen so   that she could read results. pt and wife are confused and would like to   talk to nurse.  ---------------------------------------------------------------------------  --------------  popchips  What is the best way for the office to contact you? OK to leave message on   voicemail  Preferred Call Back Phone Number? [phone]  ---------------------------------------------------------------------------  --------------  SCRIPT ANSWERS  Relationship to Patient?  Self

## 2021-02-08 ENCOUNTER — OFFICE VISIT (OUTPATIENT)
Dept: FAMILY MEDICINE CLINIC | Age: 81
End: 2021-02-08
Payer: MEDICARE

## 2021-02-08 VITALS
BODY MASS INDEX: 28.25 KG/M2 | HEART RATE: 69 BPM | TEMPERATURE: 97.3 F | WEIGHT: 185.8 LBS | OXYGEN SATURATION: 98 % | SYSTOLIC BLOOD PRESSURE: 128 MMHG | DIASTOLIC BLOOD PRESSURE: 60 MMHG

## 2021-02-08 DIAGNOSIS — I65.23 BILATERAL CAROTID ARTERY STENOSIS: ICD-10-CM

## 2021-02-08 DIAGNOSIS — R42 DIZZINESS: Primary | ICD-10-CM

## 2021-02-08 PROCEDURE — 99214 OFFICE O/P EST MOD 30 MIN: CPT | Performed by: FAMILY MEDICINE

## 2021-02-08 PROCEDURE — 1123F ACP DISCUSS/DSCN MKR DOCD: CPT | Performed by: FAMILY MEDICINE

## 2021-02-08 PROCEDURE — 1036F TOBACCO NON-USER: CPT | Performed by: FAMILY MEDICINE

## 2021-02-08 PROCEDURE — 4040F PNEUMOC VAC/ADMIN/RCVD: CPT | Performed by: FAMILY MEDICINE

## 2021-02-08 PROCEDURE — G8484 FLU IMMUNIZE NO ADMIN: HCPCS | Performed by: FAMILY MEDICINE

## 2021-02-08 PROCEDURE — G8427 DOCREV CUR MEDS BY ELIG CLIN: HCPCS | Performed by: FAMILY MEDICINE

## 2021-02-08 PROCEDURE — G8417 CALC BMI ABV UP PARAM F/U: HCPCS | Performed by: FAMILY MEDICINE

## 2021-02-08 RX ORDER — ASPIRIN 81 MG/1
81 TABLET ORAL DAILY
Qty: 90 TABLET | Refills: 1 | COMMUNITY
Start: 2021-02-08

## 2021-02-08 NOTE — PROGRESS NOTES
Carole Daniel is here today to follow up recent ED visit for weakness and abnormal gait. Was seen in [facility] ED on 2/3/21. Symptoms are has improved, but is still having the dizziness when he first stands up. Wife mentions that he was weak also. Reviewed chart notes, labs and imaging in [facility] and/or [facility]. States started Sunday night where felt like everything moving, woke up Monday morning and so dizzy couldn't get out of bed, lasted few days and went to ED on Wed as not getting better. Work up neg in ED. Got meclizine in ED, has been taking TID and seems to be helping. States felt like everything was spinning, like everything was \"going around and around. \" Dizziness is worse when first gets up but once up and takes few steps it gets better. Has been on aricept 5mg and done well, started on 10mg last week but started those after the dizziness was already going on. + nausea but no vomiting. Eating better, almost back to normal.     No change in hearing with this. Vitals:    02/08/21 1058   BP: 128/60   Site: Left Upper Arm   Position: Sitting   Cuff Size: Medium Adult   Pulse: 69   Temp: 97.3 °F (36.3 °C)   TempSrc: Infrared   SpO2: 98%   Weight: 185 lb 12.8 oz (84.3 kg)     Wt Readings from Last 3 Encounters:   02/08/21 185 lb 12.8 oz (84.3 kg)   02/03/21 180 lb (81.6 kg)   01/04/21 184 lb 9.6 oz (83.7 kg)     Body mass index is 28.25 kg/m². Alert and oriented x 4 NAD, affect appropriate and overweight, well hydrated, well developed.   Bilateral pinna, canal and TM normal  Lungs CTA B with good air movement  CV RRR no M appreciated  CN grossly intact  PERRL, EOMI, no nystagmus  Normal UE and LE strength shoulder, elbow, , hip, knee and foot  Normal reflexes bilaterally knee and elbow  Normal sensation to light touch upper and lower extremities bilaterally  Normal FTN, JODIE normal, unsteady rhomberg, neg pronator drift  Normal gait  Tandem gait very unsteady        ASSESSMENT AND PLAN:       Kristi Adhikari was seen today for follow-up from hospital.    Diagnoses and all orders for this visit:    Dizziness    Bilateral carotid artery stenosis    Other orders  -     aspirin EC 81 MG EC tablet; Take 1 tablet by mouth daily    Discussed could be viral, stroke is possibility.   At this point getting MRI would not change what we are doing  Restart ASA  Continue sx tx, try weaning meclizine  If sx not improving in next week consider MRI and/or referral to ENT for vestibular testing

## 2021-02-09 NOTE — ED PROVIDER NOTES
As physician-in-triage, I performed a medical screening history and physical exam on [address]. I also cared for and evaluated the patient in conjunction with the ED Advanced Practice Provider. All diagnostic, treatment, and disposition decisions were made by myself in conjunction with the advanced practice provider. For all further details of the patient's emergency department visit, please see the advanced practice provider's documentation. Patient presents to the ER for evaluation of transient gait disorder he has a history of significant dementia collateral history is obtained from his wife he has no dysmetria no truncal ataxia has had progressive gait disorder, possible underlying movement disorder as well as well as underlying dementia, he has no large vessel occlusion, duration of symptoms have been greater than 24 to 48 hours, they desire outpatient MRI imaging, he is stable for outpatient management oral aspirin he has no other focal deficits at this point in time, no nystagmus no truncal ataxia and stable for outpatient management.       Impression: Gait disorder, dementia      Adilene Chowdary MD  57/84/71 [id number]

## 2021-02-22 DIAGNOSIS — E78.5 DYSLIPIDEMIA: ICD-10-CM

## 2021-02-22 NOTE — TELEPHONE ENCOUNTER
Received refill request for atorvastatin from [address] [place] [address]      Last ov:12/2/2020 [facility] labs:cmp, lipid 12/7/2020    Last Refill: 5/11/2020 #90 with 3 refills    Next appointment:12/8/2021 [facility]

## 2021-02-23 RX ORDER — ATORVASTATIN CALCIUM 80 MG/1
TABLET, FILM COATED ORAL
Qty: 90 TABLET | Refills: 3 | Status: SHIPPED | OUTPATIENT
Start: 2021-02-23 | End: 2021-11-29

## 2021-04-09 ENCOUNTER — TELEPHONE (OUTPATIENT)
Dept: FAMILY MEDICINE CLINIC | Age: 81
End: 2021-04-09

## 2021-04-09 NOTE — TELEPHONE ENCOUNTER
----- Message from Dyson Christiane sent at 4/8/2021 11:19 AM EDT -----  Subject: Appointment Request    Reason for Call: Routine Existing Condition Follow Up    QUESTIONS  Type of Appointment? Established Patient  Reason for appointment request? Available appointments did not meet   patient need  Additional Information for Provider? Pt needs to schedule for a RTC 3   month appt pt missed appt on 4/5/2021 would like to schedule in person   visit for wednesday 4/14/2021 millie Garcias can be reached at [phone] pt   screen green   ---------------------------------------------------------------------------  --------------  CALL BACK INFO  What is the best way for the office to contact you? OK to leave message on   voicemail  Preferred Call Back Phone Number? [phone]  ---------------------------------------------------------------------------  --------------  SCRIPT ANSWERS  Relationship to Patient? Other  Representative Name? Kimberly  Additional information verified (besides Name and Date of Birth)? Address  Appointment reason? Well Care/Follow Ups  Select a Well Care/Follow Ups appointment reason? Adult Existing Condition   Follow Up [Diabetes   CHF   COPD   Hypertension/Blood Pressure Check]  (Is the patient requesting to be seen urgently for their symptoms?)? No  Is this follow up request related to routine Diabetes Management? No  Are you having any new concerns about your existing condition? No  Have you been diagnosed with   tested for   or told that you are suspected of having COVID-19 (Coronavirus)? No  Have you had a fever or taken medication to treat a fever within the past   3 days? No  Have you had a cough   shortness of breath or flu-like symptoms within the past 3 days? No  Do you currently have flu-like symptoms including fever or chills   cough   shortness of breath   or difficulty breathing   or new loss of taste or smell?  No  (Service Expert  click yes below to proceed with [employer] As Usual Scheduling)?  Yes

## 2021-05-06 ENCOUNTER — OFFICE VISIT (OUTPATIENT)
Dept: FAMILY MEDICINE CLINIC | Age: 81
End: 2021-05-06
Payer: MEDICARE

## 2021-05-06 VITALS
HEART RATE: 69 BPM | DIASTOLIC BLOOD PRESSURE: 62 MMHG | SYSTOLIC BLOOD PRESSURE: 106 MMHG | OXYGEN SATURATION: 98 % | WEIGHT: 189 LBS | BODY MASS INDEX: 28.74 KG/M2

## 2021-05-06 DIAGNOSIS — F03.90 DEMENTIA WITHOUT BEHAVIORAL DISTURBANCE, UNSPECIFIED DEMENTIA TYPE: Primary | ICD-10-CM

## 2021-05-06 PROCEDURE — 1036F TOBACCO NON-USER: CPT | Performed by: FAMILY MEDICINE

## 2021-05-06 PROCEDURE — 1123F ACP DISCUSS/DSCN MKR DOCD: CPT | Performed by: FAMILY MEDICINE

## 2021-05-06 PROCEDURE — 99214 OFFICE O/P EST MOD 30 MIN: CPT | Performed by: FAMILY MEDICINE

## 2021-05-06 PROCEDURE — G8427 DOCREV CUR MEDS BY ELIG CLIN: HCPCS | Performed by: FAMILY MEDICINE

## 2021-05-06 PROCEDURE — 4040F PNEUMOC VAC/ADMIN/RCVD: CPT | Performed by: FAMILY MEDICINE

## 2021-05-06 PROCEDURE — G8417 CALC BMI ABV UP PARAM F/U: HCPCS | Performed by: FAMILY MEDICINE

## 2021-05-06 RX ORDER — MEMANTINE HYDROCHLORIDE 14 MG/1
14 CAPSULE, EXTENDED RELEASE ORAL DAILY
Qty: 7 CAPSULE | Refills: 0 | Status: SHIPPED | OUTPATIENT
Start: 2021-05-06 | End: 2021-06-23

## 2021-05-06 RX ORDER — MEMANTINE HYDROCHLORIDE 21 MG/1
21 CAPSULE, EXTENDED RELEASE ORAL DAILY
Qty: 7 CAPSULE | Refills: 0 | Status: SHIPPED | OUTPATIENT
Start: 2021-05-06 | End: 2021-06-23

## 2021-05-06 RX ORDER — MEMANTINE HYDROCHLORIDE 28 MG/1
28 CAPSULE, EXTENDED RELEASE ORAL DAILY
Qty: 90 CAPSULE | Refills: 3 | Status: SHIPPED | OUTPATIENT
Start: 2021-05-06 | End: 2022-03-07

## 2021-05-06 RX ORDER — MEMANTINE HYDROCHLORIDE 7 MG/1
7 CAPSULE, EXTENDED RELEASE ORAL DAILY
Qty: 7 CAPSULE | Refills: 0 | Status: SHIPPED | OUTPATIENT
Start: 2021-05-06 | End: 2021-06-23

## 2021-05-06 NOTE — PROGRESS NOTES
Patient presents today for his 3 month follow up on dementia. Pt wife reporting, \"not seeing much of a change with the new memory medication. \"    No wanting to do things he used to like to do - cutting grass, walking, working with carrier pigeons, etc. Eun Longs in bed most of the day and doesn't want to get up. Sleeping ok at night. When does get up just sits around. Pt reports he doesn't feel depressed, just gets tired. Walks at track twice a week. Body mass index is 28.74 kg/m². Vitals:    05/06/21 1501   BP: 106/62   Site: Left Upper Arm   Position: Sitting   Cuff Size: Medium Adult   Pulse: 69   SpO2: 98%   Weight: 189 lb (85.7 kg)     Wt Readings from Last 3 Encounters:   05/06/21 189 lb (85.7 kg)   02/08/21 185 lb 12.8 oz (84.3 kg)   02/03/21 180 lb (81.6 kg)     PHQ score: No data recorded    GENERAL:Alert and oriented x 3 NAD, affect appropriate and overweight, well hydrated, well developed. ASSESSMENT AND PLAN:       Deo Langford was seen today for dementia and follow-up. Diagnoses and all orders for this visit:    Dementia without behavioral disturbance, unspecified dementia type ([facility] [address]  Trial adding namenda  Discussed again with wife and pt that meds help stabilize and slow progression but do not improve sx  ? Depression but pt states he feels ok, monito    Other orders  -     memantine ER (NAMENDA XR) 7 MG CP24 extended release capsule; Take 1 capsule by mouth daily for 7 days Then start 14mg  -     memantine ER (NAMENDA XR) 14 MG CP24 extended release capsule; Take 1 capsule by mouth daily for 7 days Then start 21mg  -     memantine ER (NAMENDA XR) 21 MG CP24 extended release capsule; Take 1 capsule by mouth daily for 7 days Then start 28 mg daily  -     memantine ER (NAMENDA XR) 28 MG CP24 extended release capsule; Take 1 capsule by mouth daily            Return in about 3 months (around 8/6/2021) for AWV, 30 min.

## 2021-05-10 RX ORDER — MEMANTINE HYDROCHLORIDE 7 MG/1
CAPSULE, EXTENDED RELEASE ORAL
Qty: 7 CAPSULE | Refills: 0 | OUTPATIENT
Start: 2021-05-10

## 2021-05-10 RX ORDER — MEMANTINE HYDROCHLORIDE 14 MG/1
CAPSULE, EXTENDED RELEASE ORAL
Qty: 7 CAPSULE | Refills: 0 | OUTPATIENT
Start: 2021-05-10

## 2021-05-10 RX ORDER — MEMANTINE HYDROCHLORIDE 21 MG/1
CAPSULE, EXTENDED RELEASE ORAL
Qty: 7 CAPSULE | Refills: 0 | OUTPATIENT
Start: 2021-05-10

## 2021-05-17 DIAGNOSIS — I10 ESSENTIAL HYPERTENSION: Chronic | ICD-10-CM

## 2021-05-17 DIAGNOSIS — I25.83 CORONARY ARTERY DISEASE DUE TO LIPID RICH PLAQUE: ICD-10-CM

## 2021-05-17 DIAGNOSIS — I25.10 CORONARY ARTERY DISEASE DUE TO LIPID RICH PLAQUE: ICD-10-CM

## 2021-05-17 RX ORDER — LISINOPRIL 10 MG/1
TABLET ORAL
Qty: 90 TABLET | Refills: 3 | Status: SHIPPED | OUTPATIENT
Start: 2021-05-17 | End: 2022-03-23

## 2021-05-17 RX ORDER — CLOPIDOGREL BISULFATE 75 MG/1
TABLET ORAL
Qty: 90 TABLET | Refills: 3 | Status: SHIPPED | OUTPATIENT
Start: 2021-05-17 | End: 2022-03-23

## 2021-06-21 ENCOUNTER — TELEPHONE (OUTPATIENT)
Dept: FAMILY MEDICINE CLINIC | Age: 81
End: 2021-06-21

## 2021-06-21 NOTE — TELEPHONE ENCOUNTER
Please schedule patient. Will have to be different provider since Dr. Sivakumar Lanier is out this week.

## 2021-06-21 NOTE — TELEPHONE ENCOUNTER
----- Message from Corina Guallpa sent at 6/21/2021  1:32 PM EDT -----  Subject: Appointment Request    Reason for Call: Urgent (Patient Request) No Script    QUESTIONS  Type of Appointment? Established Patient  Reason for appointment request? No appointments available during search  Additional Information for Provider? Low blood sugar , pale screened by   [facility] on 6/20/21, would like to be seen on Wednesday 6/23/21 because   that is the only day wife Ashley Ruiz has off. Screened green   ---------------------------------------------------------------------------  --------------  CALL BACK INFO  What is the best way for the office to contact you? OK to leave message on   voicemail  Preferred Call Back Phone Number? [phone]  ---------------------------------------------------------------------------  --------------  SCRIPT ANSWERS  Relationship to Patient? Other  Representative Name? Braxton Gann  Additional information verified (besides Name and Date of Birth)? Address  Appointment reason? Symptomatic  Select script based on patient symptoms? Adult No Script  (Is the patient requesting to see the provider for a procedure?)? No  (Is the patient requesting to see the provider urgently  today or   tomorrow. )? Yes  Have you been diagnosed with, awaiting test results for, or told that you   are suspected of having COVID-19 (Coronavirus)? (If patient has tested   negative or was tested as a requirement for work, school, or travel and   not based on symptoms, answer no)? No  Do you currently have flu-like symptoms including fever or chills, cough,   shortness of breath, difficulty breathing, or new loss of taste or smell? No  Have you had close contact with someone with COVID-19 in the last 14 days? No  (Service Expert  click yes below to proceed with [employer] As Usual   Scheduling)?  Yes

## 2021-06-23 ENCOUNTER — OFFICE VISIT (OUTPATIENT)
Dept: FAMILY MEDICINE CLINIC | Age: 81
End: 2021-06-23
Payer: MEDICARE

## 2021-06-23 ENCOUNTER — HOSPITAL ENCOUNTER (OUTPATIENT)
Age: 81
Discharge: HOME | End: 2021-06-23
Payer: MEDICARE

## 2021-06-23 VITALS
OXYGEN SATURATION: 97 % | HEART RATE: 63 BPM | TEMPERATURE: 97.8 F | SYSTOLIC BLOOD PRESSURE: 118 MMHG | WEIGHT: 192 LBS | DIASTOLIC BLOOD PRESSURE: 70 MMHG | BODY MASS INDEX: 29.19 KG/M2

## 2021-06-23 DIAGNOSIS — R42 LIGHTHEADED: ICD-10-CM

## 2021-06-23 DIAGNOSIS — I10 ESSENTIAL HYPERTENSION: Chronic | ICD-10-CM

## 2021-06-23 DIAGNOSIS — E78.5 DYSLIPIDEMIA: ICD-10-CM

## 2021-06-23 DIAGNOSIS — R41.3 MEMORY DEFICIT: ICD-10-CM

## 2021-06-23 DIAGNOSIS — I25.10 CORONARY ARTERY DISEASE DUE TO LIPID RICH PLAQUE: ICD-10-CM

## 2021-06-23 DIAGNOSIS — E16.2 HYPOGLYCEMIA: Primary | ICD-10-CM

## 2021-06-23 DIAGNOSIS — R55 NEAR SYNCOPE: ICD-10-CM

## 2021-06-23 DIAGNOSIS — I25.83 CORONARY ARTERY DISEASE DUE TO LIPID RICH PLAQUE: ICD-10-CM

## 2021-06-23 LAB
A/G RATIO: 1.4 (ref 1.1–2.2)
ALBUMIN SERPL-MCNC: 4.2 G/DL (ref 3.4–5)
ALP BLD-CCNC: 112 U/L (ref 40–129)
ALT SERPL-CCNC: 51 U/L (ref 10–40)
ANION GAP SERPL CALCULATED.3IONS-SCNC: 17 MMOL/L (ref 3–16)
AST SERPL-CCNC: 52 U/L (ref 15–37)
BILIRUB SERPL-MCNC: 0.6 MG/DL (ref 0–1)
BILIRUBIN URINE: NEGATIVE
BLOOD, URINE: ABNORMAL
BUN BLDV-MCNC: 18 MG/DL (ref 7–20)
CALCIUM SERPL-MCNC: 9.3 MG/DL (ref 8.3–10.6)
CHLORIDE BLD-SCNC: 105 MMOL/L (ref 99–110)
CHOLESTEROL, TOTAL: 118 MG/DL (ref 0–199)
CLARITY: ABNORMAL
CO2: 23 MMOL/L (ref 21–32)
COLOR: YELLOW
CREAT SERPL-MCNC: 1.2 MG/DL (ref 0.8–1.3)
EPITHELIAL CELLS, UA: 1 /HPF (ref 0–5)
GFR AFRICAN AMERICAN: >60
GFR NON-AFRICAN AMERICAN: 58
GLOBULIN: 3.1 G/DL
GLUCOSE BLD-MCNC: 103 MG/DL (ref 70–99)
GLUCOSE URINE: NEGATIVE MG/DL
HCT VFR BLD CALC: 46.2 % (ref 40.5–52.5)
HDLC SERPL-MCNC: 45 MG/DL (ref 40–60)
HEMOGLOBIN: 15.5 G/DL (ref 13.5–17.5)
HYALINE CASTS: 0 /LPF (ref 0–8)
KETONES, URINE: NEGATIVE MG/DL
LDL CHOLESTEROL CALCULATED: 52 MG/DL
LEUKOCYTE ESTERASE, URINE: NEGATIVE
MCH RBC QN AUTO: 31.8 PG (ref 26–34)
MCHC RBC AUTO-ENTMCNC: 33.5 G/DL (ref 31–36)
MCV RBC AUTO: 94.9 FL (ref 80–100)
MICROSCOPIC EXAMINATION: YES
NITRITE, URINE: NEGATIVE
PDW BLD-RTO: 14.1 % (ref 12.4–15.4)
PH UA: 5.5 (ref 5–8)
PLATELET # BLD: 254 K/UL (ref 135–450)
PMV BLD AUTO: 7.8 FL (ref 5–10.5)
POTASSIUM SERPL-SCNC: 4.6 MMOL/L (ref 3.5–5.1)
PROTEIN UA: NEGATIVE MG/DL
RBC # BLD: 4.87 M/UL (ref 4.2–5.9)
RBC UA: 6 /HPF (ref 0–4)
SODIUM BLD-SCNC: 145 MMOL/L (ref 136–145)
SPECIFIC GRAVITY UA: 1.02 (ref 1–1.03)
TOTAL PROTEIN: 7.3 G/DL (ref 6.4–8.2)
TRIGL SERPL-MCNC: 103 MG/DL (ref 0–150)
TSH REFLEX: 3.34 UIU/ML (ref 0.27–4.2)
URINE TYPE: ABNORMAL
UROBILINOGEN, URINE: 0.2 E.U./DL
VLDLC SERPL CALC-MCNC: 21 MG/DL
WBC # BLD: 7.5 K/UL (ref 4–11)
WBC UA: 1 /HPF (ref 0–5)

## 2021-06-23 PROCEDURE — 84443 ASSAY THYROID STIM HORMONE: CPT

## 2021-06-23 PROCEDURE — G8417 CALC BMI ABV UP PARAM F/U: HCPCS | Performed by: PHYSICIAN ASSISTANT

## 2021-06-23 PROCEDURE — 80053 COMPREHEN METABOLIC PANEL: CPT

## 2021-06-23 PROCEDURE — 85027 COMPLETE CBC AUTOMATED: CPT

## 2021-06-23 PROCEDURE — 1123F ACP DISCUSS/DSCN MKR DOCD: CPT | Performed by: PHYSICIAN ASSISTANT

## 2021-06-23 PROCEDURE — 4040F PNEUMOC VAC/ADMIN/RCVD: CPT | Performed by: PHYSICIAN ASSISTANT

## 2021-06-23 PROCEDURE — 82607 VITAMIN B-12: CPT

## 2021-06-23 PROCEDURE — 99213 OFFICE O/P EST LOW 20 MIN: CPT | Performed by: PHYSICIAN ASSISTANT

## 2021-06-23 PROCEDURE — 36415 COLL VENOUS BLD VENIPUNCTURE: CPT

## 2021-06-23 PROCEDURE — G8427 DOCREV CUR MEDS BY ELIG CLIN: HCPCS | Performed by: PHYSICIAN ASSISTANT

## 2021-06-23 PROCEDURE — 1036F TOBACCO NON-USER: CPT | Performed by: PHYSICIAN ASSISTANT

## 2021-06-23 PROCEDURE — 82746 ASSAY OF FOLIC ACID SERUM: CPT

## 2021-06-23 PROCEDURE — 81001 URINALYSIS AUTO W/SCOPE: CPT

## 2021-06-23 PROCEDURE — 80061 LIPID PANEL: CPT

## 2021-06-23 ASSESSMENT — ENCOUNTER SYMPTOMS
BACK PAIN: 0
SHORTNESS OF BREATH: 0
COUGH: 0
ABDOMINAL PAIN: 0

## 2021-06-23 NOTE — PROGRESS NOTES
Subjective:      Patient ID: Berlin Arteaga is a 79 y.o. male. HPI Patient is here today due to calling squad 3 days ago. Went to breakfast and ate a waffle and young around 10:30, drank black coffee. Then around 2-2:30, he was sitting in his chair, started feeling sweaty, he was hot and pale. He denies chest pain, SOB, dizziness, lightheadedness or nausea. He did ask his wife if it was dark in the room. His wife called 46. He might them out in the yard, saying he was fine. EKG was fine. His blood sugar was 55. They said his BP looked good. They went inside and ate cheerios and Dr. Elyssa Mancera. They went to [employer] and ate early dinner. Patient says he feels fine, did feel fine and has never felt better. He had Covid in December and his memory has declined since then. He doesn't even remember having it at all. Review of Systems   Constitutional: Negative for fatigue and unexpected weight change. HENT: Negative for nosebleeds. Eyes: Negative for visual disturbance. Respiratory: Negative for cough and shortness of breath. Cardiovascular: Negative for chest pain, palpitations and leg swelling. Gastrointestinal: Negative for abdominal pain. Musculoskeletal: Negative for back pain. Neurological: Negative for dizziness and headaches. Objective:   Physical Exam  Vitals reviewed. Constitutional:       Appearance: Normal appearance. He is well-developed and well-groomed. Cardiovascular:      Rate and Rhythm: Normal rate and regular rhythm. Heart sounds: Normal heart sounds. Pulmonary:      Effort: Pulmonary effort is normal.      Breath sounds: Normal breath sounds. Musculoskeletal:      Right lower leg: No edema. Left lower leg: No edema. Skin:     General: Skin is warm and dry. Findings: No rash. Neurological:      Mental Status: He is alert and oriented to person, place, and time.    Psychiatric:         Attention and Perception: Attention normal. Mood and Affect: Mood normal.         Speech: Speech normal.         Behavior: Behavior normal. Behavior is cooperative. Assessment:      Letty Rice was seen today for other. Diagnoses and all orders for this visit:    Hypoglycemia  -     Comprehensive Metabolic Panel  -     URINALYSIS WITH MICROSCOPIC    Lightheaded  -     CBC Auto Differential  -     Comprehensive Metabolic Panel    Near syncope  -     TSH without Reflex             Plan:      Get labs and told him to keep candy with him just in case it happens again.          Umang Curryma

## 2021-06-23 NOTE — PATIENT INSTRUCTIONS
Maria Dolores Perezu was seen today for other. Diagnoses and all orders for this visit:    Hypoglycemia  -     Comprehensive Metabolic Panel  -     URINALYSIS WITH MICROSCOPIC    Lightheaded  -     CBC Auto Differential  -     Comprehensive Metabolic Panel    Near syncope  -     TSH without Reflex       Get labs done and make sure you have hard candy with you all the time.

## 2021-06-24 LAB
FOLATE: 12.31 NG/ML (ref 4.78–24.2)
VITAMIN B-12: 521 PG/ML (ref 211–911)

## 2021-07-07 ENCOUNTER — TELEPHONE (OUTPATIENT)
Dept: FAMILY MEDICINE CLINIC | Age: 81
End: 2021-07-07

## 2021-07-12 ENCOUNTER — TELEPHONE (OUTPATIENT)
Dept: FAMILY MEDICINE CLINIC | Age: 81
End: 2021-07-12

## 2021-07-12 DIAGNOSIS — R79.89 ABNORMAL LFTS: Primary | ICD-10-CM

## 2021-07-12 NOTE — TELEPHONE ENCOUNTER
PT's wife Mauro Somers called to get husbands bloodwork results because, the PT has Dementia.  Please give her a call back on [phone]

## 2021-07-14 ENCOUNTER — HOSPITAL ENCOUNTER (OUTPATIENT)
Age: 81
Discharge: HOME | End: 2021-07-14
Payer: MEDICARE

## 2021-07-14 DIAGNOSIS — R79.89 ABNORMAL LFTS: ICD-10-CM

## 2021-07-14 LAB
ALBUMIN SERPL-MCNC: 4.1 G/DL (ref 3.4–5)
ALP BLD-CCNC: 106 U/L (ref 40–129)
ALT SERPL-CCNC: 39 U/L (ref 10–40)
AST SERPL-CCNC: 35 U/L (ref 15–37)
BILIRUB SERPL-MCNC: 0.5 MG/DL (ref 0–1)
BILIRUBIN DIRECT: <0.2 MG/DL (ref 0–0.3)
BILIRUBIN, INDIRECT: NORMAL MG/DL (ref 0–1)
TOTAL PROTEIN: 7.1 G/DL (ref 6.4–8.2)

## 2021-07-14 PROCEDURE — 36415 COLL VENOUS BLD VENIPUNCTURE: CPT

## 2021-07-14 PROCEDURE — 80076 HEPATIC FUNCTION PANEL: CPT

## 2021-07-21 ENCOUNTER — OFFICE VISIT (OUTPATIENT)
Dept: DERMATOLOGY | Age: 81
End: 2021-07-21
Payer: MEDICARE

## 2021-07-21 VITALS — TEMPERATURE: 97.7 F

## 2021-07-21 DIAGNOSIS — L57.0 AK (ACTINIC KERATOSIS): ICD-10-CM

## 2021-07-21 DIAGNOSIS — L82.1 SK (SEBORRHEIC KERATOSIS): Primary | ICD-10-CM

## 2021-07-21 DIAGNOSIS — Z85.828 HISTORY OF BASAL CELL CARCINOMA: ICD-10-CM

## 2021-07-21 PROCEDURE — 99213 OFFICE O/P EST LOW 20 MIN: CPT | Performed by: DERMATOLOGY

## 2021-07-21 PROCEDURE — 1123F ACP DISCUSS/DSCN MKR DOCD: CPT | Performed by: DERMATOLOGY

## 2021-07-21 PROCEDURE — 4040F PNEUMOC VAC/ADMIN/RCVD: CPT | Performed by: DERMATOLOGY

## 2021-07-21 PROCEDURE — G8427 DOCREV CUR MEDS BY ELIG CLIN: HCPCS | Performed by: DERMATOLOGY

## 2021-07-21 PROCEDURE — G8417 CALC BMI ABV UP PARAM F/U: HCPCS | Performed by: DERMATOLOGY

## 2021-07-21 PROCEDURE — 1036F TOBACCO NON-USER: CPT | Performed by: DERMATOLOGY

## 2021-07-21 NOTE — PROGRESS NOTES
[facility]  Misael Jorge MD  [phone]      Tolu Spears  1940    79 y.o. male     Date of Visit: 7/21/2021    Chief Complaint: skin lesions    History of Present Illness:    1. He complains of several growths on the upper chest and back. His wife reports that he picks at the lesions. 2.  He also has a few asymptomatic lesions on the sides of the face. 3.  He has a history of a nodular BCC on the left upper backexcised on 8/3/2018. Denies any signs of recurrence. On ASA and Plavix. Review of Systems:  Gen: Feels well, good sense of health. Past Medical History, Family History, Surgical History, Medications and Allergies reviewed.     Past Medical History:   Diagnosis Date    Actinic keratosis     Allergic rhinitis, cause unspecified     Degeneration of lumbar or lumbosacral intervertebral disc     Other abnormal blood chemistry     Routine general medical examination at a health care facility     S/P drug eluting coronary stent placement 5/19/2016    Circumflex     Status post insertion of drug-eluting stent into right coronary artery for coronary artery disease 5/19/2016    ×3     Unspecified essential hypertension      Past Surgical History:   Procedure Laterality Date    APPENDECTOMY      OTHER SURGICAL HISTORY  12/98    basal cell CA-; back-removed 12/98        Allergies   Allergen Reactions    Pcn [Penicillins] Hives     Outpatient Medications Marked as Taking for the 7/21/21 encounter (Office Visit) with Joshua Lo MD   Medication Sig Dispense Refill    clopidogrel (PLAVIX) 75 MG tablet TAKE 1 TABLET EVERY DAY 90 tablet 3    lisinopril (PRINIVIL;ZESTRIL) 10 MG tablet TAKE 1 TABLET EVERY DAY 90 tablet 3    memantine ER (NAMENDA XR) 28 MG CP24 extended release capsule Take 1 capsule by mouth daily 90 capsule 3    atorvastatin (LIPITOR) 80 MG tablet TAKE 1 TABLET EVERY DAY 90 tablet 3    aspirin EC 81 MG EC tablet Take 1 tablet by mouth daily 90 tablet 1    Multiple Vitamins-Minerals (MULTIVITAMIN ADULT PO) Take by mouth      donepezil (ARICEPT) 10 MG tablet Take 1 tablet by mouth nightly 90 tablet 3    nitroGLYCERIN (NITROSTAT) 0.4 MG SL tablet PLACE 1 TABLET UNDER THE TONGUE EVERY 5 MINUTES AS NEEDED FOR CHEST PAIN 25 tablet 3         Physical Examination       The following were examined and determined to be normal: Psych/Neuro, Scalp/hair, Conjunctivae/eyelids, Gums/teeth/lips, Neck, Breast/axilla/chest, Abdomen, Back, RUE and LUE. The following were examined and determined to be abnormal: Head/face. Well appearing. 1.  Scattered on the upper chest and back are stuck on appearing verrucous brown papules and plaques. Left temple with a stuck on appearing verrucous thin tan plaque. 2.  Right side of the face with few scaly pink macules and patches. Helices of the ears with few scaly skin colored macules. 3.  Clear. Assessment and Plan     1. SK (seborrheic keratosis) - multiple    Reassurance. 2. AK (actinic keratosis) - several, small asymptomatic lesions    Observe. We discussed the relation to chronic cumulative sun exposure and the low premalignant potential.     Counseling regarding sun protective behaviors was performed including sun avoidance, hats and use of at least SPF 30 sunscreen. 3. History of basal cell carcinoma - clear    Sun protective behaviors, including use of at least SPF 30 sunscreen, and self skin examinations were encouraged. Call for any new or concerning lesions. Return in about 1 year (around 7/21/2022).     --Ansley Dean MD

## 2021-08-09 ENCOUNTER — OFFICE VISIT (OUTPATIENT)
Dept: FAMILY MEDICINE CLINIC | Age: 81
End: 2021-08-09
Payer: MEDICARE

## 2021-08-09 VITALS
HEIGHT: 66 IN | SYSTOLIC BLOOD PRESSURE: 120 MMHG | WEIGHT: 187.2 LBS | BODY MASS INDEX: 30.08 KG/M2 | HEART RATE: 67 BPM | DIASTOLIC BLOOD PRESSURE: 70 MMHG | OXYGEN SATURATION: 95 % | TEMPERATURE: 97.7 F

## 2021-08-09 DIAGNOSIS — Z00.00 WELL ADULT EXAM: Primary | ICD-10-CM

## 2021-08-09 DIAGNOSIS — F03.90 DEMENTIA WITHOUT BEHAVIORAL DISTURBANCE, UNSPECIFIED DEMENTIA TYPE: ICD-10-CM

## 2021-08-09 PROCEDURE — 1123F ACP DISCUSS/DSCN MKR DOCD: CPT | Performed by: FAMILY MEDICINE

## 2021-08-09 PROCEDURE — G0439 PPPS, SUBSEQ VISIT: HCPCS | Performed by: FAMILY MEDICINE

## 2021-08-09 PROCEDURE — 4040F PNEUMOC VAC/ADMIN/RCVD: CPT | Performed by: FAMILY MEDICINE

## 2021-08-09 RX ORDER — DONEPEZIL HYDROCHLORIDE 10 MG/1
10 TABLET, FILM COATED ORAL NIGHTLY
Qty: 90 TABLET | Refills: 3 | Status: SHIPPED | OUTPATIENT
Start: 2021-08-09 | End: 2022-07-28

## 2021-08-09 ASSESSMENT — PATIENT HEALTH QUESTIONNAIRE - PHQ9
2. FEELING DOWN, DEPRESSED OR HOPELESS: 0
SUM OF ALL RESPONSES TO PHQ QUESTIONS 1-9: 0
1. LITTLE INTEREST OR PLEASURE IN DOING THINGS: 0
SUM OF ALL RESPONSES TO PHQ9 QUESTIONS 1 & 2: 0

## 2021-08-09 NOTE — PROGRESS NOTES
[facility] VISIT    Patient is here for their Medicare Annual Wellness Visit discuss testing and dementia. Wife has full POA now due to pt dementia. Wife corrects pt answers. 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 How would you rate your overall health? : Good        Fall Risk 2021 2021 2020 2020 2018 2018 2/15/2017   2 or more falls in past year? no no no no no no no   Fall with injury in past year? no no no no no no no       PHQ Scores 2016   PHQ2 Score 0 0 0 0 0 0   PHQ9 Score 0 0 0 0 0 0   little down since yesterday as dog . Do you always wear a seat belt in the car?: No - has never warn it, discussed      Have you noted any problems with hearing?: Yes right ear  Have you noted any vision problems?: No  Do you have concerns about your sexual health?: no  In the past month how much has pain been an issue for you?:  Not at all  In the past month have you had issues with anxiety, loneliness, irritability or fatigue:  Somewhat    Do you take opioid medications even sometimes? No    Living Will: Yes,   Copy scanned    Who lives at home with you: Wife    Do you have any services coming to your home (meals on wheels, home health, etc) ? : no      Do you need help with:  Using the phone:  No  Bathing: No  Dressing:  No  Toileting: No  Transportation:  No - still driving but only around town. Wife drives with him and states still ok when driving local.   Shopping: wife reports he doesn't do any of the shopping  Preparing meals: simple food and sandwich  Housework/Laundry: Wife states he doesn't do anything anymore  Medications: wife puts meds out every morning, wife does refills.    Money management: wife does all due to pt memory    Does your home have:  Unsecured throw rugs: has one in front of sink, discussed picking up or secruing   Grab bars in bathroom: No  Walk in shower:Yes  Seat in shower: No  Lit pathways for night (nightlights): no - discussed getting for montes and bath    Memory:  Have you or anyone close to you expressed concerns about your memory: Yes: known dementia on meds. Knows:  Month: No  Day: No  Year: Yes  Day of Week: No      Patient history:   Patient's medications, allergies, past medical, surgical, social and family histories were reviewed and updated in the EHR under History. Care Team:  Patient's list of care team members was updated in EHR under the Snap Shot. Immunizations: Reviewed with patient. Health Maintenance Due   Topic Date Due    Hepatitis C screen  Never done    Shingles Vaccine (1 of 2) Never done   Maite Kinsey Annual Wellness Visit (AWV)  Never done       CHRONIC CONDITIONS     Memory \"little antonio\"  Wife does not feel meds have slowed progression. Tolerating ok. Physical Exam:    Body mass index is 29.99 kg/m². Vitals:    08/09/21 1611   BP: 120/70   Site: Left Upper Arm   Position: Sitting   Cuff Size: Medium Adult   Pulse: 67   Temp: 97.7 °F (36.5 °C)   TempSrc: Infrared   SpO2: 95%   Weight: 187 lb 3.2 oz (84.9 kg)   Height: 5' 6.25\" (1.683 m)     Wt Readings from Last 3 Encounters:   08/09/21 187 lb 3.2 oz (84.9 kg)   06/23/21 192 lb (87.1 kg)   05/06/21 189 lb (85.7 kg)       GENERAL:Alert and oriented to person and place, NAD, affect appropriate and overweight, well hydrated, well developed. LUNG:clear to auscultation bilaterally with normal respiratory effort  CV: Normal heart sounds, regular rate and rhythm without murmurs  EXTREMETY: no loss of hair, no edema, normal pedal pulses bilaterally    Was the timed get up and go unsteady or longer than 20 seconds: No      Assessment/Plan:    Evelyn Hutchins was seen today for medicare awv.     Diagnoses and all orders for this visit:    Well adult exam  Recommended screenings discussed and ordered if patient agreed  Recommended vaccinations discussed and ordered if patient agreed  Encouraged healthy diet   Encouraged regular exercise and maintaining a healthy weight    Medicare Safety Interventions: Home safety tips provided  Individualized [address] included in patient instructions and AVS    Dementia without behavioral disturbance, unspecified dementia type ([facility] [address]  Discussed will continue meds for another 6 months and reevaluate. Discussed if still feels not helping consider stopping meds and seeing if any difference. Other orders  -     donepezil (ARICEPT) 10 MG tablet; Take 1 tablet by mouth nightly            Return in about 6 months (around 2/9/2022) for Follow up dementia,  30 min.            Portions of Note per  Amparo Lee CMA AAMA with corrections and edits per Sandy Veloz MD.  I agree with entirety of note and was present and performed history and physical.  I also confirm that the note above accurately reflects all work, treatment, procedures, and medical decision making performed by me, Sandy Veloz MD

## 2021-08-09 NOTE — PATIENT INSTRUCTIONS
Check with pharmacy about getting the shingles vaccine, Shingrix (not Zostavax)          Well Visit, Over 72: Care Instructions  Overview     Well visits can help you stay healthy. Your doctor has checked your overall health and may have suggested ways to take good care of yourself. Your doctor also may have recommended tests. At home, you can help prevent illness with healthy eating, regular exercise, and other steps. Follow-up care is a key part of your treatment and safety. Be sure to make and go to all appointments, and call your doctor if you are having problems. It's also a good idea to know your test results and keep a list of the medicines you take. How can you care for yourself at home? · Get screening tests that you and your doctor decide on. Screening helps find diseases before any symptoms appear. · Eat healthy foods. Choose fruits, vegetables, whole grains, protein, and low-fat dairy foods. Limit fat, especially saturated fat. Reduce salt in your diet. · Limit alcohol. If you are a man, have no more than 2 drinks a day or 14 drinks a week. If you are a woman, have no more than 1 drink a day or 7 drinks a week. Since alcohol affects older adults differently, you may want to limit alcohol even more. Or you may not want to drink at all. · Get at least 30 minutes of exercise on most days of the week. Walking is a good choice. You also may want to do other activities, such as running, swimming, cycling, or playing tennis or team sports. · Reach and stay at a healthy weight. This will lower your risk for many problems, such as obesity, diabetes, heart disease, and high blood pressure. · Do not smoke. Smoking can make health problems worse. If you need help quitting, talk to your doctor about stop-smoking programs and medicines. These can increase your chances of quitting for good. · Care for your mental health. It is easy to get weighed down by worry and stress.  Learn strategies to manage stress, like deep breathing and mindfulness, and stay connected with your family and community. If you find you often feel sad or hopeless, talk with your doctor. Treatment can help. · Talk to your doctor about whether you have any risk factors for sexually transmitted infections (STIs). You can help prevent STIs if you wait to have sex with a new partner (or partners) until you've each been tested for STIs. It also helps if you use condoms (male or female condoms) and if you limit your sex partners to one person who only has sex with you. Vaccines are available for some STIs. · If you think you may have a problem with alcohol or drug use, talk to your doctor. This includes prescription medicines (such as amphetamines and opioids) and illegal drugs (such as cocaine and methamphetamine). Your doctor can help you figure out what type of treatment is best for you. · Protect your skin from too much sun. When you're outdoors from 10 a.m. to 4 p.m., stay in the shade or cover up with clothing and a hat with a wide brim. Wear sunglasses that block UV rays. Even when it's cloudy, put broad-spectrum sunscreen (SPF 30 or higher) on any exposed skin. · See a dentist one or two times a year for checkups and to have your teeth cleaned. · Wear a seat belt in the car. When should you call for help? Watch closely for changes in your health, and be sure to contact your doctor if you have any problems or symptoms that concern you. Where can you learn more? Go to https://[email or web address].health[email or web address]. org and sign in to your [employer] account. Enter F245 in the [facility] box to learn more about \"Well Visit, Over 65: Care Instructions. \"     If you do not have an account, please click on the \"Sign Up Now\" link. Current as of: May 27, 2020               Content Version: 12.9  © 8737-1392 Healthwise, Incorporated. Care instructions adapted under license by [facility] [address]  If you have questions about a medical condition or this instruction, always ask your healthcare professional. [place] [address] any warranty or liability for your use of this information. FALL PREVENTION TIPS    Who is at high risk of falling? Anyone can fall, although the risk is higher in older people. This increased risk of falling may be the result of changes that come with aging, and certain medical conditions, such as arthritis, cataracts or hip problems. What can I do to lower my risk of falling? Most falls happen in the home. Consider the following tips to make your home safe:  -Make sure that you have good lighting in your home. A well lit home will help you avoid tripping over objects that are not easy to see. Put night lights in your bedroom, hallways, stairs and bathrooms.  -Rugs should be firmly fastened to the floor or have nonskid backing. Loose ends should be tacked down.  -Electrical cords should not be lying on the floor in walking areas.  -Put hand rails in your bathroom for bath, shower and toilet use. -Have rails on both sides of your stairs for support.  -In the kitchen, make sure items are within easy reach. Dont store things too high or too low. Then you wont have to use a stepladder or a stool to reach them. Its also a good idea to avoid storing things too low, so you wont have to bend down to get them.  -Wear shoes with firm nonskid soles. Avoid wearing loose-fitting slippers that could cause you to trip. What else can I do? Take good care of your body. Try to stay healthy by following these tips:  -See your eye doctor once a year. Cataracts and other eye diseases that cause you not to see well, can lead to falls. -Get regular physical activity to keep your bones and muscles strong.  -Take good care of your feet. If you have pain in your feet or if you have large, thick nails and corns, have your doctor look at your feet. -Talk to your doctor about any side effects you may have from your medicines. Problems caused by side effects from medicine are a common cause of falls. The more medicines you take, the greater your risk of falling.  -Talk to your doctor if you have dizzy spells.  -If your doctor suggests that you use a cane or a walker to help you walk, be sure to use it. This will give you extra stability when walking and will help you avoid falls.  -Dont smoke.  -Limit alcohol to no more than 2 drinks per day. -When you get out of bed in the morning or at night to use the bathroom, sit on the side of the bed for a few minutes before standing up. Your blood pressure takes some time to adjust when you sit up. It may be too low if you get up quickly. This can make you dizzy, and you might lose your balance and fall. Last Updated: November 2010\cb3 This article was contributed by: familydoctor. org editorial staff    [address] [place] [address]  [email or web address]    Where to Start? Call [phone] ([phone]) to speak with a nurse or  who can answer questions, explain services and assess your needs. When you call [facility]  One of our intake specialists will ask you a few questions about your health, your living arrangements and your income and medical expenses. They will also ask you for:  Social security number   Insurance information, such as Medicare and/or Medicaid number; name and policy number of supplemental or long-term care insurance   Name of an emergency contact or caregiver  What happens after that? A  will arrange an in-home visit to verify your eligibility and set up your services. Our Services  Eligibility for each service is determine by a care manager during a home visit. Adult Day Services: Structured programs provide a secure, stimulating environment  and respite for caregivers. Transportation may be available.   Care Management: A registered nurse or licensed social worker coordinates your services and monitors your care.  Consumer Directed Care: Allows you to hire a friend or family member to provide home care services. Electronic Monitoring System: 24-hour, inhome monitoring systems that provide safety and/or medication alerts. Environmental Services: Pest control, major housecleaning and waste removal.   Home Care Assistance: A trained aide helps with housekeeping, personal care and other daily activities, and provides respite (time off) for the primary caregiver. Home Modifications and Repairs: Minor repairs and safety upgrades such as ramps, grab bars and dead-bolt locks. Independent Living Assistance: Help with benefit applications, and organizing personal and household records. Meals and Nutrition Counseling: Homedelivered meals and help understanding your special dietary needs. Medical Equipment: Health or safety-related equipment such as a bath bench, cane or walker. Mental Health Services: Feelings of depression and anxiety make it harder to remain independent. A counselor can meet with you in your home to help you regain a more positive outlook and sense of well being. Transportation: Transportation to medical and other appointments.

## 2021-10-11 ENCOUNTER — OFFICE VISIT (OUTPATIENT)
Dept: FAMILY MEDICINE CLINIC | Age: 81
End: 2021-10-11
Payer: MEDICARE

## 2021-10-11 VITALS
TEMPERATURE: 97.9 F | HEART RATE: 66 BPM | SYSTOLIC BLOOD PRESSURE: 120 MMHG | WEIGHT: 187.6 LBS | DIASTOLIC BLOOD PRESSURE: 80 MMHG | OXYGEN SATURATION: 98 % | BODY MASS INDEX: 30.05 KG/M2

## 2021-10-11 DIAGNOSIS — H61.21 HEARING LOSS OF RIGHT EAR DUE TO CERUMEN IMPACTION: ICD-10-CM

## 2021-10-11 DIAGNOSIS — B02.9 HERPES ZOSTER WITHOUT COMPLICATION: Primary | ICD-10-CM

## 2021-10-11 DIAGNOSIS — Z23 NEED FOR VACCINATION: ICD-10-CM

## 2021-10-11 PROCEDURE — 99213 OFFICE O/P EST LOW 20 MIN: CPT | Performed by: FAMILY MEDICINE

## 2021-10-11 PROCEDURE — 1036F TOBACCO NON-USER: CPT | Performed by: FAMILY MEDICINE

## 2021-10-11 PROCEDURE — G0008 ADMIN INFLUENZA VIRUS VAC: HCPCS | Performed by: FAMILY MEDICINE

## 2021-10-11 PROCEDURE — G8417 CALC BMI ABV UP PARAM F/U: HCPCS | Performed by: FAMILY MEDICINE

## 2021-10-11 PROCEDURE — 1123F ACP DISCUSS/DSCN MKR DOCD: CPT | Performed by: FAMILY MEDICINE

## 2021-10-11 PROCEDURE — 90694 VACC AIIV4 NO PRSRV 0.5ML IM: CPT | Performed by: FAMILY MEDICINE

## 2021-10-11 PROCEDURE — G8428 CUR MEDS NOT DOCUMENT: HCPCS | Performed by: FAMILY MEDICINE

## 2021-10-11 PROCEDURE — G8484 FLU IMMUNIZE NO ADMIN: HCPCS | Performed by: FAMILY MEDICINE

## 2021-10-11 PROCEDURE — 4040F PNEUMOC VAC/ADMIN/RCVD: CPT | Performed by: FAMILY MEDICINE

## 2021-10-11 RX ORDER — VALACYCLOVIR HYDROCHLORIDE 1 G/1
1000 TABLET, FILM COATED ORAL 3 TIMES DAILY
Qty: 21 TABLET | Refills: 0 | Status: SHIPPED | OUTPATIENT
Start: 2021-10-11 | End: 2021-10-18

## 2021-10-11 NOTE — PROGRESS NOTES
Rash: Patient complains of rash involving the right side and wraps around back. Rash started 1 day ago. Appearance of rash at onset: Color of lesion(s): red, blisters. Wife states he has been complaining of some abd pain x 2-3 days and noted rash on back couple days ago and then the rest denisse on abd noted this AM. States itchy and sting-y. Past 2 weeks or more has lost hearing in right ear. States when covers left ear can't hear well. States was ringing for a while before that. Vitals:    10/11/21 1351   BP: 120/80   Site: Left Upper Arm   Position: Sitting   Cuff Size: Medium Adult   Pulse: 66   Temp: 97.9 °F (36.6 °C)   TempSrc: Infrared   SpO2: 98%   Weight: 187 lb 9.6 oz (85.1 kg)     Wt Readings from Last 3 Encounters:   10/11/21 187 lb 9.6 oz (85.1 kg)   08/09/21 187 lb 3.2 oz (84.9 kg)   06/23/21 192 lb (87.1 kg)     Body mass index is 30.05 kg/m². PHQ Scores 8/9/2021 1/4/2021 2/19/2020 1/21/2020 4/11/2018 11/30/2016   PHQ2 Score 0 0 0 0 0 0   PHQ9 Score 0 0 0 0 0 0           GEN: Alert and oriented to person and place, affect appropriate and obese, well hydrated, well developed. Right back and around front to abd on right side with patches of erythema with blisters  See pics in media  RIght TM blocked by wax        ASSESSMENT AND PLAN:       Brayden Chavarria was seen today for rash. Diagnoses and all orders for this visit:    Herpes zoster without complication       valACYclovir (VALTREX) 1 g tablet;  Take 1 tablet by mouth 3 times daily for 7 days    Hearing loss of right ear due to cerumen impaction  Irrigated today but unable to get it out as so deep in canal against drum  Refer to ENT      Need for vaccination  -     INFLUENZA, QUADV, ADJUVANTED, 72 YRS =, IM, PF, PREFILL SYR, 0.5ML (FLUAD)        Portions of Note per  Narinder Quesada, [facility] AAMA with corrections and edits per Kylee Hutchison MD.  I agree with entirety of note and was present and performed history and physical.  I also confirm that the note above accurately reflects all work, treatment, procedures, and medical decision making performed by me, Jordan Mnodragon MD

## 2021-10-11 NOTE — PATIENT INSTRUCTIONS
acetaminophen (Tylenol), ibuprofen (Advil, Motrin), or naproxen (Aleve). Read and follow all instructions on the label. · Avoid close contact with people until the blisters have healed. It is very important for you to avoid contact with anyone who has never had chickenpox or the chickenpox vaccine. Pregnant women, young babies, and anyone else who has a hard time fighting infection (such as someone with HIV, diabetes, or cancer) is especially at risk. When should you call for help? Call your doctor now or seek immediate medical care if:    · You have a new or higher fever.     · You have a severe headache and a stiff neck.     · You lose the ability to think clearly.     · The rash spreads to your forehead, nose, eyes, or eyelids.     · You have eye pain, or your vision gets worse.     · You have new pain in your face, or you cannot move the muscles in your face.     · Blisters spread to new parts of your body. Watch closely for changes in your health, and be sure to contact your doctor if:    · The rash has not healed after 2 to 4 weeks.     · You still have pain after the rash has healed. Where can you learn more? Go to [email or web address] org and sign in to your [employer] account. Darroll Pain in the [facility] box to learn more about \"Shingles: Care Instructions. \"     If you do not have an account, please click on the \"Sign Up Now\" link. Current as of: July 1, 2021               Content Version: 13.0  © 2006-2021 Healthwise, Incorporated. Care instructions adapted under license by [facility] ([facility]). If you have questions about a medical condition or this instruction, always ask your healthcare professional. [place] [address] any warranty or liability for your use of this information. Patient Education        Influenza (Flu) Vaccine (Inactivated or Recombinant): What You Need to Know  Why get vaccinated?   Influenza vaccine can prevent influenza (flu). Flu is a contagious disease that spreads around the [place] every year, usually between October and May. Anyone can get the flu, but it is more dangerous for some people. Infants and young children, people 72years of age and older, pregnant women, and people with certain health conditions or a weakened immune system are at greatest risk of flu complications. Pneumonia, bronchitis, sinus infections and ear infections are examples of flu-related complications. If you have a medical condition, such as heart disease, cancer or diabetes, flu can make it worse. Flu can cause fever and chills, sore throat, muscle aches, fatigue, cough, headache, and runny or stuffy nose. Some people may have vomiting and diarrhea, though this is more common in children than adults. Each year, thousands of people in the [facility] die from flu, and many more are hospitalized. Flu vaccine prevents millions of illnesses and flu-related visits to the doctor each year. Influenza vaccine  CDC recommends everyone 10months of age and older get vaccinated every flu season. Children 6 months through 6years of age may need 2 doses during a single flu season. Everyone else needs only 1 dose each flu season. It takes about 2 weeks for protection to develop after vaccination. There are many flu viruses, and they are always changing. Each year a new flu vaccine is made to protect against three or four viruses that are likely to cause disease in the upcoming flu season. Even when the vaccine doesn't exactly match these viruses, it may still provide some protection. Influenza vaccine does not cause flu. Influenza vaccine may be given at the same time as other vaccines. Talk with your health care provider  Tell your vaccine provider if the person getting the vaccine:  · Has had an allergic reaction after a previous dose of influenza vaccine, or has any severe, life-threatening allergies.   · Has ever had Guillain-Barré Syndrome (also called GBS). In some cases, your health care provider may decide to postpone influenza vaccination to a future visit. People with minor illnesses, such as a cold, may be vaccinated. People who are moderately or severely ill should usually wait until they recover before getting influenza vaccine. Your health care provider can give you more information. Risks of a vaccine reaction  · Soreness, redness, and swelling where shot is given, fever, muscle aches, and headache can happen after influenza vaccine. · There may be a very small increased risk of Guillain-Barré Syndrome (GBS) after inactivated influenza vaccine (the flu shot). Octavio Hickman children who get the flu shot along with pneumococcal vaccine (PCV13), and/or DTaP vaccine at the same time might be slightly more likely to have a seizure caused by fever. Tell your health care provider if a child who is getting flu vaccine has ever had a seizure. People sometimes faint after medical procedures, including vaccination. Tell your provider if you feel dizzy or have vision changes or ringing in the ears. As with any medicine, there is a very remote chance of a vaccine causing a severe allergic reaction, other serious injury, or death. What if there is a serious problem? An allergic reaction could occur after the vaccinated person leaves the clinic. If you see signs of a severe allergic reaction (hives, swelling of the face and throat, difficulty breathing, a fast heartbeat, dizziness, or weakness), call 9-1-1 and get the person to the nearest hospital.  For other signs that concern you, call your health care provider. Adverse reactions should be reported to the Vaccine Adverse Event Reporting System (VAERS). Your health care provider will usually file this report, or you can do it yourself. Visit the VAERS website at www.vaers. hhs.gov or call 9-562.312.4472. VAERS is only for reporting reactions, and VAERS staff do not give medical advice.   The [facility] Baljinder Vaccine Injury Compensation Program  The National Vaccine Injury Compensation Program (VICP) is a federal program that was created to compensate people who may have been injured by certain vaccines. Visit the VICP website at www.hrsa.gov/vaccinecompensation or call 1-504.636.3605 to learn about the program and about filing a claim. There is a time limit to file a claim for compensation. How can I learn more? · Ask your healthcare provider. · Call your local or state health department. · Contact the Centers for Disease Control and Prevention (CDC):  ? Call 2-497.687.6397 (1-800-CDC-INFO) or  ? Visit CDC's website at www.cdc.gov/flu  Vaccine Information Statement (Interim)  Inactivated Influenza Vaccine  8/15/2019  42 U. Ector Glover 156FT-42  Department of Health and Human Services  Centers for Disease Control and Prevention  Many Vaccine Information Statements are available in [place] and other languages. See www.immunize.org/vis. Muchas hojas de información sobre vacunas están disponibles en español y en otros idiomas. Visite www.immunize.org/vis. Care instructions adapted under license by [facility] ([facility]). If you have questions about a medical condition or this instruction, always ask your healthcare professional. [address] any warranty or liability for your use of this information.

## 2021-10-11 NOTE — PROGRESS NOTES
Ear Irrigation Procedure Note    Ear irrigation procedure was performed using a Elephant Ear Wash to the right ear(s) for cerumen impaction. The wax was deep and didn't come out after using 2 spray bottles. Patient was referred to ENT and informed to use Debrox to attempt to soften wax.

## 2021-11-03 ENCOUNTER — OFFICE VISIT (OUTPATIENT)
Dept: ENT CLINIC | Age: 81
End: 2021-11-03
Payer: MEDICARE

## 2021-11-03 VITALS — TEMPERATURE: 97.3 F | HEART RATE: 64 BPM | DIASTOLIC BLOOD PRESSURE: 82 MMHG | SYSTOLIC BLOOD PRESSURE: 129 MMHG

## 2021-11-03 DIAGNOSIS — H90.0 CONDUCTIVE HEARING LOSS, BILATERAL: ICD-10-CM

## 2021-11-03 DIAGNOSIS — H61.23 IMPACTED CERUMEN OF BOTH EARS: Primary | ICD-10-CM

## 2021-11-03 PROCEDURE — 69210 REMOVE IMPACTED EAR WAX UNI: CPT | Performed by: OTOLARYNGOLOGY

## 2021-11-03 NOTE — PATIENT INSTRUCTIONS
1. Schedule an audiogram (hearing test), if your hearing is not back to your usual ability, or if hearing loss or tinnitus (ringing or other noise) persists, despite removal of ear wax,.  2. You may use an over the counter ear wax removal kit (such as Murine, Bausch and Lomb, NeilMed, or Debrox wax removal system) for ear wax removal, as needed. 3. It may help to use Debrox (OTC) for 4 days prior to future visits for ear cleaning. This may soften your ear wax and facilitate removal of the wax. NO Q-TIPS OR OTHER INSTRUMENTS/OBJECTS IN THE EARS   You should never clean your ears with a Q-tip, cotton tipped applicator, Denise pin, paper clip, safety pin, pen cap, or any other instrument. This will tend to push wax in deeper and pack the ear canal with wax. There is a high risk and danger of this practice, especially rupture of ear drum, dislocation or other damage to ossicles, and permanent, irreversible, and irreparable hearing loss. It may cause inflammation and irritation of the ear canal and cause itching or pain. I recommend only use of one the several ear wax removal kits available \"over the counter\" if you feel a need to try to remove ear wax. For example, Murine, Bausch and Lomb, NeilMed, or Debrox ear wax removal kits may be used for ear wax removal, as needed. No other methods should be self used for cleaning your ears.

## 2021-11-03 NOTE — PROGRESS NOTES
Chief Complaint   Patient presents with    Cerumen Impaction     right ear, decreased hearing        HISTORY:    Balwinder Mccracken stated that the hearing is decreased in both ears, which are plugged up with ear wax. EXAMINATION:    Both external ear canals were occluded by cerumen impaction, obscuring visualization of the TMs. PROCEDURE - REMOVAL OF BILATERAL CERUMEN IMPACTION:   The cerumen impaction was removed from both of the EACs, under otomicroscopy visualization, with instrumentation, using a Billeau wire loop and Palomares ear suction   After successful cerumen removal, the EACs appeared to be normal and clear bilaterally without mass, exudate, or edema. The tympanic membranes appeared to be normal.  There was no evidence of acute disease. Balwinder Mccracken reported improved hearing, back to usual level, after cerumen removal.          HEARING ASSESSMENT:  Finger rub:  Able to hear finger rub bilaterally. Tuning fork tests, 512 Hertz tuning fork:  Urban midline and Rinne air > bone bilaterally. IMPRESSION / Elbridge Kick / Alicia Salinas / PROCEDURES:       Balwinder Mccracken was seen today for cerumen impaction. Diagnoses and all orders for this visit:    Impacted cerumen of both ears    Conductive hearing loss, bilateral        RECOMMENDATIONS / PLAN:   1. See Patient Instructions on file for this visit. 2. Return for recurrence of symptoms of excessive ear wax, or any other ear, nose, throat, or sinus problems.

## 2021-11-26 DIAGNOSIS — E78.5 DYSLIPIDEMIA: ICD-10-CM

## 2021-11-29 NOTE — TELEPHONE ENCOUNTER
Last RF - 2/23/21 (90+3)  Last labs - 6/23/21 - stable, checked by PCP  Last ov - 12/2/20 [facility]  Next ov - 12/8/21 [facility]

## 2021-12-03 RX ORDER — ATORVASTATIN CALCIUM 80 MG/1
TABLET, FILM COATED ORAL
Qty: 90 TABLET | Refills: 3 | Status: SHIPPED | OUTPATIENT
Start: 2021-12-03 | End: 2022-10-03 | Stop reason: SDUPTHER

## 2021-12-07 NOTE — PROGRESS NOTES
[address]    2021     Avinash Corral (:  1940) is a 79 y.o. male is here for follow-up management of CAD modifiable risk factors. Referring Provider: Marvin Galdamez MD    HISTORY:  Mr. Hector Gandara has a history of CAD, s/p RHETT of RCA x 3 and Lcx x 1. He had borderline disease in the distal LAD and Diagonal which has been treated medically. Additional history includes HTN, hyperlipidemia, and has difficulties with his memory. Today, he denies significant chest discomfort, shortness of breath, light headedness, dizziness or palpitations. REVIEW OF SYSTEMS:  A complete review of systems has been reviewed and updated today and is negative except as noted in the history of present illness. Prior to Visit Medications    Medication Sig Taking?  Authorizing Provider   Zinc 100 MG TABS Take 100 % by mouth daily Yes Historical Provider, MD   atorvastatin (LIPITOR) 80 MG tablet TAKE 1 TABLET EVERY DAY Yes Ernesto Bobo MD   donepezil (ARICEPT) 10 MG tablet Take 1 tablet by mouth nightly Yes Marvin Galdamez MD   clopidogrel (PLAVIX) 75 MG tablet TAKE 1 TABLET EVERY DAY Yes Ernesto Bobo MD   lisinopril (PRINIVIL;ZESTRIL) 10 MG tablet TAKE 1 TABLET EVERY DAY Yes Ernesto Bobo MD   memantine ER (NAMENDA XR) 28 MG CP24 extended release capsule Take 1 capsule by mouth daily Yes Marvin Galdamez MD   aspirin EC 81 MG EC tablet Take 1 tablet by mouth daily Yes Marvin Galdamez MD   Multiple Vitamins-Minerals (MULTIVITAMIN ADULT PO) Take by mouth Yes Historical Provider, MD   nitroGLYCERIN (NITROSTAT) 0.4 MG SL tablet PLACE 1 TABLET UNDER THE TONGUE EVERY 5 MINUTES AS NEEDED FOR CHEST PAIN Yes Ernesto Bobo MD        Allergies   Allergen Reactions    Pcn [Penicillins] Hives       Past Medical History:   Diagnosis Date    Actinic keratosis     Allergic rhinitis, cause unspecified     Degeneration of lumbar or lumbosacral intervertebral disc     Other abnormal blood chemistry     Routine general medical examination at a health care facility     S/P drug eluting coronary stent placement 5/19/2016    Circumflex     Status post insertion of drug-eluting stent into right coronary artery for coronary artery disease 5/19/2016    ×3     Unspecified essential hypertension        Past Surgical History:   Procedure Laterality Date    APPENDECTOMY      OTHER SURGICAL HISTORY  12/98    basal cell CA-; back-removed 12/98        Social History     Tobacco Use    Smoking status: Never Smoker    Smokeless tobacco: Never Used   Substance Use Topics    Alcohol use: No     Alcohol/week: 1.0 standard drink     Types: 1 Cans of beer per week        Family History   Problem Relation Age of Onset    Heart Disease Other     Heart Attack Mother     Hypertension Mother     Emphysema Father     Blindness Sister     Stroke Sister     Dementia Sister        PHYSICAL EXAMINATION:  Vitals:    12/08/21 1155   BP: 100/60   Pulse: 68   SpO2: 98%   Weight: 183 lb 3.2 oz (83.1 kg)   Height: 5' 7\" (1.702 m)     Estimated body mass index is 28.69 kg/m² as calculated from the following:    Height as of this encounter: 5' 7\" (1.702 m). Weight as of this encounter: 183 lb 3.2 oz (83.1 kg). Physical Exam  Constitutional:       Appearance: He is well-developed. He is not diaphoretic. HENT:      Head: Normocephalic and atraumatic. Eyes:      General: No scleral icterus. Extraocular Movements: Extraocular movements intact. Conjunctiva/sclera: Conjunctivae normal.   Neck:      Vascular: No JVD. Cardiovascular:      Rate and Rhythm: Normal rate and regular rhythm. Heart sounds: Normal heart sounds. No murmur heard. No friction rub. No gallop. Pulmonary:      Effort: Pulmonary effort is normal. No respiratory distress. Breath sounds: Normal breath sounds. No wheezing or rales. Abdominal:      General: Bowel sounds are normal.      Palpations: Abdomen is soft. Tenderness: There is no abdominal tenderness. Musculoskeletal:         General: Normal range of motion. Cervical back: Normal range of motion and neck supple. Skin:     General: Skin is warm and dry. Findings: No rash. Neurological:      General: No focal deficit present. Mental Status: He is alert and oriented to person, place, and time. Psychiatric:         Mood and Affect: Mood normal.         Behavior: Behavior normal.         Thought Content: Thought content normal.         Judgment: Judgment normal.           I have reviewed all pertinent lab results and diagnostic testing. Lab Results   Component Value Date    CHOL 118 06/23/2021    TRIG 103 06/23/2021    HDL 45 06/23/2021    LDLCALC 52 06/23/2021     Lab Results   Component Value Date     06/23/2021    K 4.6 06/23/2021    K 4.2 02/03/2021     06/23/2021    CO2 23 06/23/2021    GLUCOSE 103 06/23/2021    GLUCOSE 126 12/07/2020    BUN 18 06/23/2021    CREATININE 1.2 06/23/2021    CALCIUM 9.3 06/23/2021    GFRAA >60 06/23/2021     Lab Results   Component Value Date    ALT 39 07/14/2021    AST 35 07/14/2021    AST 27 12/07/2020     CTA of head and neck with contrast 2/3/21:  1. No acute intracranial abnormality. 2. Atherosclerosis in bilateral carotid bifurcations resulting in   approximately 50% stenosis at the origin of bilateral ICAs. 3. Moderately severe short segment stenosis of the distal right P1 segment. Myoview GXT 9/5/18:      Summary    There is normal isotope uptake at stress and rest. There is no evidence of    myocardial ischemia or scar.    Normal LV function.    Overall findings represent a low risk scan.           Myoview stress test 8/17/16:  Summary       No EKG evidence for ischemia with exercise       Preserved LV systolic function.   LVEF 65%       Myocardial perfusion imaging with no evidence for ischemia with exercise       Excessive motion noted with imaging which may impact diagnostic accuracy.             Cardiac Cath 5/18/16:  Anatomy:   LM-normal  LAD-50% mid, 70% distal at the apex  D1-50% ostial, 70% proximal  Cx-99% mid with left to left collaterals  RCA-70% mid long, 99% distal hazy  RPDA- patent  LVEF- 50 % with inferior hypokinesis, no significant mitral regurgitation  PCI: Circumflex 99% to 0% mid with a 2.25 mm x 26 mm Medtronic resolute drug-eluting stent postdilated with a 2.5 mm noncompliant balloon. RCA 70% to 0% mid with 2.5 mm x 30 mm Medtronic resolute eluding stent and a 2.5 mm x 22 mm Medtronic resolute drug-eluting stent. The distal RCA is 99% to 0% with a 2.25 mm x 22 mm Medtronic resolute drug-eluting stent.     Impression:  1. Severe multivessel coronary artery disease  2. Successful drug-eluting stent implantation in the RCA ×3 and circumflex ×1  3. Borderline severe CAD of the distal LAD at the apex in the proximal diagonal  4. Preserved LV systolic function     Plan:  1. Effient/equivalent for minimum of 1 year  2. Aspirin indefinitely  3. No beta blocker due to bradycardia  4. Attempted medical therapy of the distal LAD and diagonal. Cardiac risk stratification with exercise Myoview stress testing and approximately 3-6 months.         ASSESSMENT/PLAN:  1. Coronary artery disease due to lipid rich plaque  - 5/18/16 [facility] - severe multivessel disease - s/p RHETT x 3 to RCA, RHETT x 1 to LCx. Borderline distal LAD and diagonal disease.    -  9/5/18 Myoview GXT (c/o of SOB when walking longer distances) -- normal perfusion, normal EF.    -  treated with  Plavix, statin, ASA (is he taking this? Was not on ASA at last ov)      Plan : Stable. Continue current medical regimen. 2. Essential hypertension  - Blood pressure 100/60, pulse 68, height 5' 7\" (1.702 m), weight 183 lb 3.2 oz (83.1 kg), SpO2 98 %. - treated with Lisinopril. - 6/23/21 -- K+ 4.6, Creat 1.2, GFR 58    Plan : Stable. Continue current medical regimen.       3. Dyslipidemia  -  6/23/21  -- T Chol - 118, TG- 103, HDL- 45, LDL- 52. ALT 51 (H), AST 52 (H)   -  treated with Lipitor 80 mg daily    Plan: Stable. Continue current medical regimen. Fasting lipid profile, LFTs, CK.     4.  Carotid artery disease  - 2/3/21 CTA of head and neck -- atherosclerosis in bilateral carotid bifurcations, approximately 50% stenosis at origin of bilateral ICAs      Plan: Stable. Continue current medical regimen. 5.  Fatigue  -  sleeps poorly at night. Referred to Dr. Elsa Maria, patient patient not interested in sleep evaluation. - 6/23/21 -  TSH 3/34    Plan : Stable. Continue current medical regimen. 6.  Short term memory loss   - 1/8/21 CT head wo contrast - showed atrophy and some small vessel disease.    - started on Aricept by Dr. Gabriela Ruiz 1/2021. Also on Namenda XR    Plan : Now diagnosed with Alzheimer's dementia. Per PCP. Plan:  1. Stable. Continue current medical regimen. 2.  Fasting lipid profile, LFTs, CK in 6-12 months. 3.  RTC in 1 year. Scribe's attestation: This note was scribed in the presence of Lexi Cooper M.D. by Linn Mcneill RN    Physician Attestation: The scribe's documentation has been prepared under my direction and personally reviewed by me in its entirety. I confirm that the note above accurately reflects all work, treatment, procedures, and medical decision making performed by me. An  electronic signature was used to authenticate this note. Daylin Pittman MD, [facility], [address]

## 2021-12-08 ENCOUNTER — OFFICE VISIT (OUTPATIENT)
Dept: CARDIOLOGY CLINIC | Age: 81
End: 2021-12-08
Payer: MEDICARE

## 2021-12-08 VITALS
HEART RATE: 68 BPM | OXYGEN SATURATION: 98 % | BODY MASS INDEX: 28.75 KG/M2 | WEIGHT: 183.2 LBS | SYSTOLIC BLOOD PRESSURE: 100 MMHG | HEIGHT: 67 IN | DIASTOLIC BLOOD PRESSURE: 60 MMHG

## 2021-12-08 DIAGNOSIS — I65.23 BILATERAL CAROTID ARTERY STENOSIS: ICD-10-CM

## 2021-12-08 DIAGNOSIS — R53.82 CHRONIC FATIGUE: ICD-10-CM

## 2021-12-08 DIAGNOSIS — I10 ESSENTIAL HYPERTENSION: ICD-10-CM

## 2021-12-08 DIAGNOSIS — I25.10 CORONARY ARTERY DISEASE DUE TO LIPID RICH PLAQUE: Primary | ICD-10-CM

## 2021-12-08 DIAGNOSIS — Z87.898 HISTORY OF SHORT TERM MEMORY LOSS: ICD-10-CM

## 2021-12-08 DIAGNOSIS — I25.83 CORONARY ARTERY DISEASE DUE TO LIPID RICH PLAQUE: Primary | ICD-10-CM

## 2021-12-08 DIAGNOSIS — E78.5 DYSLIPIDEMIA: ICD-10-CM

## 2021-12-08 PROCEDURE — G8417 CALC BMI ABV UP PARAM F/U: HCPCS | Performed by: INTERNAL MEDICINE

## 2021-12-08 PROCEDURE — 1123F ACP DISCUSS/DSCN MKR DOCD: CPT | Performed by: INTERNAL MEDICINE

## 2021-12-08 PROCEDURE — G8484 FLU IMMUNIZE NO ADMIN: HCPCS | Performed by: INTERNAL MEDICINE

## 2021-12-08 PROCEDURE — 4040F PNEUMOC VAC/ADMIN/RCVD: CPT | Performed by: INTERNAL MEDICINE

## 2021-12-08 PROCEDURE — 1036F TOBACCO NON-USER: CPT | Performed by: INTERNAL MEDICINE

## 2021-12-08 PROCEDURE — 99214 OFFICE O/P EST MOD 30 MIN: CPT | Performed by: INTERNAL MEDICINE

## 2021-12-08 PROCEDURE — G8427 DOCREV CUR MEDS BY ELIG CLIN: HCPCS | Performed by: INTERNAL MEDICINE

## 2021-12-08 RX ORDER — B-COMPLEX WITH VITAMIN C
100 TABLET ORAL DAILY
COMMUNITY

## 2021-12-08 NOTE — PATIENT INSTRUCTIONS
1.  No change in medications  2. Labs ordered by Dr. Rubén Francois  3. Call our office with any concerning cardiac symptoms  4. Stay active, goal is 150 minutes of activity per week start with exercising three days a week). Recommend using stationary bike, treadmill, walk the track at [address]  5.   Follow up with Dr. Sadie Marquez in one year

## 2022-01-10 ENCOUNTER — TELEPHONE (OUTPATIENT)
Dept: FAMILY MEDICINE CLINIC | Age: 82
End: 2022-01-10

## 2022-01-10 NOTE — TELEPHONE ENCOUNTER
Patients wife called to ask what stage of dementia he is in.   She is attempting to find someone she can talk to to help her      Please advise and give her a callback

## 2022-03-07 RX ORDER — MEMANTINE HYDROCHLORIDE 28 MG/1
CAPSULE, EXTENDED RELEASE ORAL
Qty: 90 CAPSULE | Refills: 3 | Status: SHIPPED | OUTPATIENT
Start: 2022-03-07 | End: 2022-10-31 | Stop reason: SDUPTHER

## 2022-03-23 DIAGNOSIS — I10 ESSENTIAL HYPERTENSION: Chronic | ICD-10-CM

## 2022-03-23 DIAGNOSIS — I25.10 CORONARY ARTERY DISEASE DUE TO LIPID RICH PLAQUE: ICD-10-CM

## 2022-03-23 DIAGNOSIS — I25.83 CORONARY ARTERY DISEASE DUE TO LIPID RICH PLAQUE: ICD-10-CM

## 2022-03-23 RX ORDER — LISINOPRIL 10 MG/1
TABLET ORAL
Qty: 90 TABLET | Refills: 3 | Status: SHIPPED | OUTPATIENT
Start: 2022-03-23 | End: 2022-10-31 | Stop reason: SDUPTHER

## 2022-03-23 RX ORDER — CLOPIDOGREL BISULFATE 75 MG/1
TABLET ORAL
Qty: 90 TABLET | Refills: 3 | Status: SHIPPED | OUTPATIENT
Start: 2022-03-23 | End: 2022-10-31 | Stop reason: SDUPTHER

## 2022-03-23 NOTE — TELEPHONE ENCOUNTER
Last RF for both meds -  5/17/21 -- 90+3  Last lab 6/2021 - stable kidney fx, electrolytes, H/H  Last ov 12/8/21 - no change in meds  Next ov - recall list for 12/2022

## 2022-07-20 ENCOUNTER — OFFICE VISIT (OUTPATIENT)
Dept: DERMATOLOGY | Age: 82
End: 2022-07-20
Payer: MEDICARE

## 2022-07-20 DIAGNOSIS — L57.0 AK (ACTINIC KERATOSIS): ICD-10-CM

## 2022-07-20 DIAGNOSIS — L85.1 STUCCO KERATOSES: ICD-10-CM

## 2022-07-20 DIAGNOSIS — L82.1 SK (SEBORRHEIC KERATOSIS): Primary | ICD-10-CM

## 2022-07-20 PROCEDURE — 1123F ACP DISCUSS/DSCN MKR DOCD: CPT | Performed by: DERMATOLOGY

## 2022-07-20 PROCEDURE — 17000 DESTRUCT PREMALG LESION: CPT | Performed by: DERMATOLOGY

## 2022-07-20 PROCEDURE — 17003 DESTRUCT PREMALG LES 2-14: CPT | Performed by: DERMATOLOGY

## 2022-07-20 PROCEDURE — 99212 OFFICE O/P EST SF 10 MIN: CPT | Performed by: DERMATOLOGY

## 2022-07-20 NOTE — PROGRESS NOTES
Formerly [facility], [facility]  Yue Almonte MD  [phone]      Terry Garcia  1940    [de-identified] y.o. male     Date of Visit: 7/20/2022    Chief Complaint: skin lesions    History of Present Illness:    Here today with his wife. He has multiple asymptomatic growths on the trunk and extremities. 2.  He also has several persistent scaly lesions on the left ear and right side of the face. 3.  He has multiple asymptomatic growths on the feet and ankles. Dermatologic history:    He has a history of a nodular BCC on the left upper back-excised on 8/3/2018. On ASA and Plavix. Review of Systems:  Gen: Feels well, good sense of health. Past Medical History, Family History, Surgical History, Medications and Allergies reviewed.     Past Medical History:   Diagnosis Date    Actinic keratosis     Allergic rhinitis, cause unspecified     Degeneration of lumbar or lumbosacral intervertebral disc     Other abnormal blood chemistry     Routine general medical examination at a health care facility     S/P drug eluting coronary stent placement 5/19/2016    Circumflex     Status post insertion of drug-eluting stent into right coronary artery for coronary artery disease 5/19/2016    ×3     Unspecified essential hypertension      Past Surgical History:   Procedure Laterality Date    APPENDECTOMY      OTHER SURGICAL HISTORY  12/98    basal cell CA-; back-removed 12/98        Allergies   Allergen Reactions    Pcn [Penicillins] Hives     Outpatient Medications Marked as Taking for the 7/20/22 encounter (Office Visit) with Tesfaye Hutchinson MD   Medication Sig Dispense Refill    lisinopril (PRINIVIL;ZESTRIL) 10 MG tablet TAKE 1 TABLET EVERY DAY 90 tablet 3    clopidogrel (PLAVIX) 75 MG tablet TAKE 1 TABLET EVERY DAY 90 tablet 3    memantine ER (NAMENDA XR) 28 MG CP24 extended release capsule TAKE 1 CAPSULE EVERY DAY 90 capsule 3    Zinc 100 MG TABS Take 100 % by mouth daily      atorvastatin (LIPITOR) 80 MG tablet TAKE 1 TABLET EVERY DAY 90 tablet 3    donepezil (ARICEPT) 10 MG tablet Take 1 tablet by mouth nightly 90 tablet 3    aspirin EC 81 MG EC tablet Take 1 tablet by mouth daily 90 tablet 1    Multiple Vitamins-Minerals (MULTIVITAMIN ADULT PO) Take by mouth      nitroGLYCERIN (NITROSTAT) 0.4 MG SL tablet PLACE 1 TABLET UNDER THE TONGUE EVERY 5 MINUTES AS NEEDED FOR CHEST PAIN 25 tablet 3       Retired [employer]. 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 Physical Examination       The following were examined and determined to be normal: Psych/Neuro, Scalp/hair, Conjunctivae/eyelids, Gums/teeth/lips, Neck, Breast/axilla/chest, Abdomen, Back, RUE, LUE, RLE, LLE, and Nails/digits. The following were examined and determined to be abnormal: Head/face. Well-appearing. 1.  Trunk and extremities with multiple stuck on appearing verrucous brown papules and plaques. 2.  Right side of the forehead-1, right lower cheek-1, left superior helix-2: Several ill-defined scaly erythematous macules. 3.  Ankles and feet with stuck on appearing verrucous white papules. Assessment and Plan     1. SK (seborrheic keratosis) - multiple    Reassurance. 2. AK (actinic keratosis) -     Cryotherapy was discussed and patient agreed to proceed. Consent was obtained. 4 lesions were treated cryotherapy: Right side of the forehead-1, right lower cheek-1, left superior helix-2. 2 cycles of liquid nitrogen applied to each lesion for 5 seconds using a Cry-Ac cryo spray gun. Patient was educated regarding the potential risks of blister formation and discomfort. Wound care was discussed. The patient tolerated the procedure well and there were no immediate complications. 3. Stucco keratoses     Reassurance. Return in about 1 year (around 7/20/2023).     --Suman Garcia MD

## 2022-07-28 RX ORDER — DONEPEZIL HYDROCHLORIDE 10 MG/1
10 TABLET, FILM COATED ORAL NIGHTLY
Qty: 90 TABLET | Refills: 0 | Status: SHIPPED | OUTPATIENT
Start: 2022-07-28 | End: 2022-10-31 | Stop reason: SDUPTHER

## 2022-07-28 NOTE — TELEPHONE ENCOUNTER
Medication:   Requested Prescriptions     Pending Prescriptions Disp Refills    donepezil (ARICEPT) 10 MG tablet [Pharmacy Med Name: DONEPEZIL HCL 10 MG Tablet] 90 tablet 3     Sig: TAKE 1 TABLET EVERY NIGHT          Patient Phone Number: [phone] (home) [phone] (work)    Last appt: 10/11/2021   Next appt: Visit date not found    Last OARRS: No flowsheet data found.   PDMP Monitoring:    Last PDMP Aime Olmos as Reviewed [facility]):  Review User Review Instant Review Result          Preferred Pharmacy:   [address] [facility] (Now [address] [facility] [facility]) - [place] [phone] - F [phone]  [address] [place] [address]  Phone: [phone] Fax: [phone]    [facility] [id number] [address] [phone] Michele James [phone]  [address] [place] [address]  Phone: [phone] Fax: [phone]

## 2022-10-03 ENCOUNTER — TELEPHONE (OUTPATIENT)
Dept: CARDIOLOGY CLINIC | Age: 82
End: 2022-10-03

## 2022-10-03 DIAGNOSIS — E78.5 DYSLIPIDEMIA: ICD-10-CM

## 2022-10-03 DIAGNOSIS — I25.10 CORONARY ARTERY DISEASE DUE TO LIPID RICH PLAQUE: Primary | ICD-10-CM

## 2022-10-03 DIAGNOSIS — I10 ESSENTIAL HYPERTENSION: ICD-10-CM

## 2022-10-03 DIAGNOSIS — I25.83 CORONARY ARTERY DISEASE DUE TO LIPID RICH PLAQUE: Primary | ICD-10-CM

## 2022-10-03 RX ORDER — ATORVASTATIN CALCIUM 80 MG/1
80 TABLET, FILM COATED ORAL DAILY
Qty: 90 TABLET | Refills: 1 | Status: SHIPPED | OUTPATIENT
Start: 2022-10-03

## 2022-10-03 NOTE — TELEPHONE ENCOUNTER
Prescription sent to pharmacy. Lab orders mailed to patient's home. Appointment with Dr. Naomy Torres scheduled on 11/9/22 at 9:45 am.  Wife voiced understanding of all information discussed.

## 2022-10-03 NOTE — TELEPHONE ENCOUNTER
Spoke to wife (okay per HIPAA) about patient's refill for Atorvastatin. Patient needs repeat fasting lipids, AST, ALT, CBC, BMP to be drawn soon and also needs to schedule an appointment with Dr. Chandu Tate for his annual visit.

## 2022-10-12 ENCOUNTER — HOSPITAL ENCOUNTER (OUTPATIENT)
Age: 82
Discharge: HOME | End: 2022-10-12
Payer: MEDICARE

## 2022-10-12 DIAGNOSIS — E78.5 DYSLIPIDEMIA: ICD-10-CM

## 2022-10-12 DIAGNOSIS — I25.10 CORONARY ARTERY DISEASE DUE TO LIPID RICH PLAQUE: ICD-10-CM

## 2022-10-12 DIAGNOSIS — I25.83 CORONARY ARTERY DISEASE DUE TO LIPID RICH PLAQUE: ICD-10-CM

## 2022-10-12 DIAGNOSIS — I10 ESSENTIAL HYPERTENSION: ICD-10-CM

## 2022-10-12 LAB
ALT SERPL-CCNC: 36 U/L (ref 10–40)
ANION GAP SERPL CALCULATED.3IONS-SCNC: 9 MMOL/L (ref 3–16)
AST SERPL-CCNC: 28 U/L (ref 15–37)
BUN BLDV-MCNC: 22 MG/DL (ref 7–20)
CALCIUM SERPL-MCNC: 9 MG/DL (ref 8.3–10.6)
CHLORIDE BLD-SCNC: 103 MMOL/L (ref 99–110)
CHOLESTEROL, TOTAL: 124 MG/DL (ref 0–199)
CO2: 28 MMOL/L (ref 21–32)
CREAT SERPL-MCNC: 1.3 MG/DL (ref 0.8–1.3)
GFR AFRICAN AMERICAN: >60
GFR NON-AFRICAN AMERICAN: 53
GLUCOSE BLD-MCNC: 102 MG/DL (ref 70–99)
HCT VFR BLD CALC: 45.7 % (ref 40.5–52.5)
HDLC SERPL-MCNC: 44 MG/DL (ref 40–60)
HEMOGLOBIN: 15.4 G/DL (ref 13.5–17.5)
LDL CHOLESTEROL CALCULATED: 63 MG/DL
MCH RBC QN AUTO: 31.9 PG (ref 26–34)
MCHC RBC AUTO-ENTMCNC: 33.7 G/DL (ref 31–36)
MCV RBC AUTO: 94.7 FL (ref 80–100)
PDW BLD-RTO: 14.3 % (ref 12.4–15.4)
PLATELET # BLD: 272 K/UL (ref 135–450)
PMV BLD AUTO: 7.6 FL (ref 5–10.5)
POTASSIUM SERPL-SCNC: 4 MMOL/L (ref 3.5–5.1)
RBC # BLD: 4.82 M/UL (ref 4.2–5.9)
SODIUM BLD-SCNC: 140 MMOL/L (ref 136–145)
TRIGL SERPL-MCNC: 84 MG/DL (ref 0–150)
VLDLC SERPL CALC-MCNC: 17 MG/DL
WBC # BLD: 6.7 K/UL (ref 4–11)

## 2022-10-12 PROCEDURE — 80061 LIPID PANEL: CPT

## 2022-10-12 PROCEDURE — 85027 COMPLETE CBC AUTOMATED: CPT

## 2022-10-12 PROCEDURE — 80048 BASIC METABOLIC PNL TOTAL CA: CPT

## 2022-10-12 PROCEDURE — 84460 ALANINE AMINO (ALT) (SGPT): CPT

## 2022-10-12 PROCEDURE — 36415 COLL VENOUS BLD VENIPUNCTURE: CPT

## 2022-10-12 PROCEDURE — 84450 TRANSFERASE (AST) (SGOT): CPT

## 2022-10-17 ENCOUNTER — TELEPHONE (OUTPATIENT)
Dept: CARDIOLOGY CLINIC | Age: 82
End: 2022-10-17

## 2022-10-17 NOTE — TELEPHONE ENCOUNTER
I spoke with pt's wife and relayed labs results per Rock Holcomb. she verbalized understanding and will let pt know about his labs results.

## 2022-10-31 ENCOUNTER — OFFICE VISIT (OUTPATIENT)
Dept: FAMILY MEDICINE CLINIC | Age: 82
End: 2022-10-31
Payer: MEDICARE

## 2022-10-31 VITALS
DIASTOLIC BLOOD PRESSURE: 60 MMHG | WEIGHT: 191.8 LBS | BODY MASS INDEX: 30.1 KG/M2 | TEMPERATURE: 97.7 F | HEIGHT: 67 IN | SYSTOLIC BLOOD PRESSURE: 120 MMHG

## 2022-10-31 DIAGNOSIS — I10 ESSENTIAL HYPERTENSION: Chronic | ICD-10-CM

## 2022-10-31 DIAGNOSIS — Z00.00 WELL ADULT EXAM: Primary | ICD-10-CM

## 2022-10-31 DIAGNOSIS — N18.31 STAGE 3A CHRONIC KIDNEY DISEASE (HCC): ICD-10-CM

## 2022-10-31 DIAGNOSIS — F03.90 DEMENTIA WITHOUT BEHAVIORAL DISTURBANCE (HCC): ICD-10-CM

## 2022-10-31 PROBLEM — N18.30 CHRONIC RENAL DISEASE, STAGE III (HCC): Status: ACTIVE | Noted: 2022-10-31

## 2022-10-31 PROCEDURE — 3074F SYST BP LT 130 MM HG: CPT | Performed by: FAMILY MEDICINE

## 2022-10-31 PROCEDURE — 3078F DIAST BP <80 MM HG: CPT | Performed by: FAMILY MEDICINE

## 2022-10-31 PROCEDURE — G8427 DOCREV CUR MEDS BY ELIG CLIN: HCPCS | Performed by: FAMILY MEDICINE

## 2022-10-31 PROCEDURE — G0439 PPPS, SUBSEQ VISIT: HCPCS | Performed by: FAMILY MEDICINE

## 2022-10-31 PROCEDURE — G8484 FLU IMMUNIZE NO ADMIN: HCPCS | Performed by: FAMILY MEDICINE

## 2022-10-31 PROCEDURE — 1036F TOBACCO NON-USER: CPT | Performed by: FAMILY MEDICINE

## 2022-10-31 PROCEDURE — 99214 OFFICE O/P EST MOD 30 MIN: CPT | Performed by: FAMILY MEDICINE

## 2022-10-31 PROCEDURE — G8417 CALC BMI ABV UP PARAM F/U: HCPCS | Performed by: FAMILY MEDICINE

## 2022-10-31 PROCEDURE — 1123F ACP DISCUSS/DSCN MKR DOCD: CPT | Performed by: FAMILY MEDICINE

## 2022-10-31 RX ORDER — MEMANTINE HYDROCHLORIDE 28 MG/1
CAPSULE, EXTENDED RELEASE ORAL
Qty: 90 CAPSULE | Refills: 3 | Status: SHIPPED | OUTPATIENT
Start: 2022-10-31

## 2022-10-31 RX ORDER — LISINOPRIL 10 MG/1
TABLET ORAL
Qty: 90 TABLET | Refills: 3 | Status: SHIPPED | OUTPATIENT
Start: 2022-10-31

## 2022-10-31 RX ORDER — DONEPEZIL HYDROCHLORIDE 10 MG/1
10 TABLET, FILM COATED ORAL NIGHTLY
Qty: 90 TABLET | Refills: 3 | Status: SHIPPED | OUTPATIENT
Start: 2022-10-31

## 2022-10-31 RX ORDER — CLOPIDOGREL BISULFATE 75 MG/1
TABLET ORAL
Qty: 90 TABLET | Refills: 3 | Status: SHIPPED | OUTPATIENT
Start: 2022-10-31

## 2022-10-31 ASSESSMENT — PATIENT HEALTH QUESTIONNAIRE - PHQ9
SUM OF ALL RESPONSES TO PHQ QUESTIONS 1-9: 0
2. FEELING DOWN, DEPRESSED OR HOPELESS: 0
1. LITTLE INTEREST OR PLEASURE IN DOING THINGS: 0
SUM OF ALL RESPONSES TO PHQ QUESTIONS 1-9: 0
SUM OF ALL RESPONSES TO PHQ9 QUESTIONS 1 & 2: 0

## 2022-10-31 NOTE — PROGRESS NOTES
[facility] [facility] VISIT    Patient is here for their Medicare Annual Wellness Visit     Last eye exam: a year, is aware he is due  Last dental exam: within the past 6 months  Exercise: walks a little, some weights  Diet: in general, a \"healthy\" diet  , on average, 2 meals per day    How would you rate your overall health? : Good        Fall Risk 10/31/2022 8/9/2021 1/4/2021 2/19/2020 1/21/2020 4/11/2018 4/11/2018   2 or more falls in past year? no no no no no no no   Fall with injury in past year? no no no no no no no       PHQ Scores 10/31/2022 8/9/2021 1/4/2021 2/19/2020 1/21/2020 4/11/2018 11/30/2016   PHQ2 Score 0 0 0 0 0 0 0   PHQ9 Score 0 0 0 0 0 0 0       Do you always wear a seat belt in the car?: No      Have you noted any problems with hearing?: No  Have you noted any vision problems?: has some trouble seeing driving at night  Do you have concerns about your sexual health?: no  In the past month how much has pain been an issue for you?:  Not at all  In the past month have you had issues with anxiety, loneliness, irritability or fatigue:  Not at all    Do you take opioid medications even sometimes? No     Living Will: Yes,   Copy on file      Healthcare Decision Maker:    Primary Decision Maker: Yossi Vanegas - Spouse - [phone]    Secondary Decision Maker: Osorio Gonzales - Child  Click here to complete Healthcare Decision Makers including selection of the Healthcare Decision Maker Relationship (ie \"Primary\"). Today we documented Decision Maker(s). The patient will provide ACP documents.       Who lives at home with you: wife, no pets  Do you have any services coming to your home (meals on wheels, home health, etc) ?: neighbors help with yard, mowing, etc      Do you need help with:  Using the phone:  No  Bathing: No  Dressing:  No  Toileting: No  Transportation: yes - needs supervision at night per wife  Shopping: yes   Preparing meals: yes - never cooked per wife  Housework/Laundry: can do simple stuff  Medications: wife manages due to pt memory  Money management: wife manages due to pt memory    Does your home have:  Unsecured throw rugs: Yes  Grab bars in bathroom: No  Walk in shower: Yes  Seat in shower: No  Lit pathways for night (nightlights): no - discussed getting    Memory:  Have you or anyone close to you expressed concerns about your memory: Yes: is on medication    Knows:  Month: Yes  Day: Yes  Year: Yes  Day of Week: No  Able to Recall (tree, fork, ball) : Yes    Patient history:   Patient's medications, allergies, past medical, surgical, social and family histories were reviewed and updated in the EHR under History. Social History     Substance and Sexual Activity   Alcohol Use Yes    Alcohol/week: 1.0 standard drink    Types: 1 Cans of beer per week    Comment: 2  beers a week       Social History     Substance and Sexual Activity   Drug Use No       Social History     Tobacco Use   Smoking Status Never   Smokeless Tobacco Never           Care Team:  Patient's list of care team members was updated in EHR under the Snap Shot. Immunizations: Reviewed with patient. Wife reports got shingles and Flu at [facility] Due   Topic Date Due    Shingles vaccine (1 of 2) Never done    COVID-19 Vaccine (3 - Booster for Moderna series) 06/24/2021    Flu vaccine (1) 08/01/2022       CHRONIC CONDITIONS / ACUTE COMPLAINTS     Checks BP at home and runs similar to here. Taking lisinopril nightly, no SE. Memory - on aricept and namenda, takes reg no SE. Wife feels like memory has been stable over past year. Physical Exam:    Body mass index is 30.49 kg/m².   Vitals:    10/31/22 1645   BP: 120/60   Site: Left Upper Arm   Position: Sitting   Cuff Size: Large Adult   Temp: 97.7 °F (36.5 °C)   TempSrc: Infrared   Weight: 191 lb 12.8 oz (87 kg)   Height: 5' 6.5\" (1.689 m)     Wt Readings from Last 3 Encounters:   10/31/22 191 lb 12.8 oz (87 kg) 12/08/21 183 lb 3.2 oz (83.1 kg)   10/11/21 187 lb 9.6 oz (85.1 kg)       GENERAL:Alert and oriented x 3 NAD, affect appropriate and obese, well hydrated, well developed. LUNG:clear to auscultation bilaterally with normal respiratory effort  CV: Normal heart sounds, regular rate and rhythm without murmurs  EXTREMETY: no loss of hair, no edema, normal pedal pulses bilaterally    Was the timed get up and go unsteady or longer than 20 seconds: No        Assessment/Plan:    Gerald Hidalgo was seen today for medicare awv. Diagnoses and all orders for this visit:    Well adult exam  Recommended screenings discussed and ordered if patient agreed  Recommended vaccinations discussed and ordered if patient agreed  Encouraged healthy diet   Encouraged regular exercise and maintaining a healthy weight    Medicare Safety Interventions: Home safety tips provided  Individualized [address] included in patient instructions and AVS    Dementia without behavioral disturbance (HCC)  -Stable, continue current medications. Discussed driving, that if eye doc recommends cataract surgery again needs to get done. Discussed with wife if he refuses and she has concerns about his driving I can send letter to state if needed. Stage 3a chronic kidney disease ([facility] [address]  Reviewed pre-visit labs with patient. (BMP) Review of these labs contributed to MDM. Stable       Essential hypertension  -     lisinopril (PRINIVIL;ZESTRIL) 10 MG tablet; TAKE 1 TABLET EVERY DAY  -Stable, continue current medications. Other orders  -     donepezil (ARICEPT) 10 MG tablet; Take 1 tablet by mouth nightly  -     clopidogrel (PLAVIX) 75 MG tablet; TAKE 1 TABLET EVERY DAY  -     memantine ER (NAMENDA XR) 28 MG CP24 extended release capsule; TAKE 1 CAPSULE EVERY DAY          Return in about 6 months (around 4/30/2023) for Follow up, 30 min.            Portions of Note per  Chase Servin CMA AAMA with corrections and edits per Jesús Tabor MD.  I agree with entirety of note and was present and performed history and physical.  I also confirm that the note above accurately reflects all work, treatment, procedures, and medical decision making performed by me, Quinton Vega MD

## 2022-12-12 NOTE — PROGRESS NOTES
[address]    2022     Jeet Vuong (:  1940) is a [de-identified] y.o. male is here for follow-up management of CAD modifiable risk factors. Referring Provider: German Hurt MD    HISTORY:  Mr. Deyanira Arrieta has a history of CAD, s/p RHETT of RCA x 3 and Lcx x 1 '16. He had borderline disease in the distal LAD and Diagonal which has been treated medically. Additional history includes HTN, hyperlipidemia, and has difficulties with his memory. Today Octavio Cheng is here with his wife. He reports he is doing well. He denies any chest pain or shortness of breath. His wife is trying to convince him to get his eyes checked for cataracts so he can continue to drive, he feels his eye sight is perfect. REVIEW OF SYSTEMS:  A complete review of systems has been reviewed and updated today and is negative except as noted in the history of present illness. Prior to Visit Medications    Medication Sig Taking?  Authorizing Provider   donepezil (ARICEPT) 10 MG tablet Take 1 tablet by mouth nightly Yes German Hurt MD   lisinopril (PRINIVIL;ZESTRIL) 10 MG tablet TAKE 1 TABLET EVERY DAY Yes German Hurt MD   clopidogrel (PLAVIX) 75 MG tablet TAKE 1 TABLET EVERY DAY Yes German Hurt MD   memantine ER (NAMENDA XR) 28 MG CP24 extended release capsule TAKE 1 CAPSULE EVERY DAY Yes German Hurt MD   atorvastatin (LIPITOR) 80 MG tablet Take 1 tablet by mouth daily Yes Caridad Lucero MD   Zinc 100 MG TABS Take 100 % by mouth daily Yes Historical Provider, MD   aspirin EC 81 MG EC tablet Take 1 tablet by mouth daily Yes German Hurt MD   Multiple Vitamins-Minerals (MULTIVITAMIN ADULT PO) Take by mouth Yes Historical Provider, MD   nitroGLYCERIN (NITROSTAT) 0.4 MG SL tablet PLACE 1 TABLET UNDER THE TONGUE EVERY 5 MINUTES AS NEEDED FOR CHEST PAIN Yes Caridad Lucero MD        Allergies   Allergen Reactions    Pcn [Penicillins] Hives       Past Medical History:   Diagnosis Date    Actinic keratosis     Allergic rhinitis, cause unspecified     Degeneration of lumbar or lumbosacral intervertebral disc     Other abnormal blood chemistry     Routine general medical examination at a health care facility     S/P drug eluting coronary stent placement 5/19/2016    Circumflex     Status post insertion of drug-eluting stent into right coronary artery for coronary artery disease 5/19/2016    ×3     Unspecified essential hypertension        Past Surgical History:   Procedure Laterality Date    APPENDECTOMY      CORONARY ANGIOPLASTY WITH STENT PLACEMENT      x5    OTHER SURGICAL HISTORY  12/01/1998    basal cell CA-; back-removed 12/98        Social History     Tobacco Use    Smoking status: Never    Smokeless tobacco: Never   Substance Use Topics    Alcohol use: Yes     Alcohol/week: 1.0 standard drink     Types: 1 Cans of beer per week     Comment: 2  beers a week        Family History   Problem Relation Age of Onset    Heart Attack Mother     Hypertension Mother     Emphysema Father     Blindness Sister     Stroke Sister     Dementia Sister     Heart Disease Other     No Known Problems Son     No Known Problems Son        PHYSICAL EXAMINATION:  Vitals:    12/14/22 1145   BP: 110/60   Pulse: 78   SpO2: 98%   Weight: 188 lb 1.6 oz (85.3 kg)   Height: 5' 7\" (1.702 m)       Estimated body mass index is 29.46 kg/m² as calculated from the following:    Height as of this encounter: 5' 7\" (1.702 m). Weight as of this encounter: 188 lb 1.6 oz (85.3 kg). Physical Exam  Constitutional:       Appearance: He is well-developed. He is not diaphoretic. HENT:      Head: Normocephalic and atraumatic. Eyes:      General: No scleral icterus. Extraocular Movements: Extraocular movements intact. Conjunctiva/sclera: Conjunctivae normal.   Neck:      Vascular: No JVD. Cardiovascular:      Rate and Rhythm: Normal rate and regular rhythm. Heart sounds: Normal heart sounds. No murmur heard.     No friction rub. No gallop. Pulmonary:      Effort: Pulmonary effort is normal. No respiratory distress. Breath sounds: Normal breath sounds. No wheezing or rales. Abdominal:      General: Bowel sounds are normal.      Palpations: Abdomen is soft. Tenderness: There is no abdominal tenderness. Musculoskeletal:         General: Normal range of motion. Cervical back: Normal range of motion and neck supple. Skin:     General: Skin is warm and dry. Findings: No rash. Neurological:      General: No focal deficit present. Mental Status: He is alert and oriented to person, place, and time. Psychiatric:         Mood and Affect: Mood normal.         Behavior: Behavior normal.         Thought Content: Thought content normal.         Judgment: Judgment normal.         I have reviewed all pertinent lab results and diagnostic testing. Lab Results   Component Value Date/Time    CHOL 124 10/12/2022 10:32 AM    TRIG 84 10/12/2022 10:32 AM    HDL 44 10/12/2022 10:32 AM    LDLCALC 63 10/12/2022 10:32 AM     Lab Results   Component Value Date/Time     10/12/2022 10:32 AM    K 4.0 10/12/2022 10:32 AM    K 4.2 02/03/2021 05:05 PM     10/12/2022 10:32 AM    CO2 28 10/12/2022 10:32 AM    GLUCOSE 102 10/12/2022 10:32 AM    GLUCOSE 126 12/07/2020 09:41 AM    BUN 22 10/12/2022 10:32 AM    CREATININE 1.3 10/12/2022 10:32 AM    CALCIUM 9.0 10/12/2022 10:32 AM    GFRAA >60 10/12/2022 10:32 AM     Lab Results   Component Value Date/Time    ALT 36 10/12/2022 10:32 AM    AST 28 10/12/2022 10:32 AM    AST 27 12/07/2020 09:41 AM     CTA of head and neck with contrast 2/3/21:  1. No acute intracranial abnormality. 2. Atherosclerosis in bilateral carotid bifurcations resulting in   approximately 50% stenosis at the origin of bilateral ICAs. 3. Moderately severe short segment stenosis of the distal right P1 segment.        Myoview GXT 9/5/18:      Summary    There is normal isotope uptake at stress and rest. There is no evidence of    myocardial ischemia or scar. Normal LV function. Overall findings represent a low risk scan. Myoview stress test 8/17/16:  Summary       No EKG evidence for ischemia with exercise       Preserved LV systolic function. LVEF 65%       Myocardial perfusion imaging with no evidence for ischemia with exercise       Excessive motion noted with imaging which may impact diagnostic accuracy. Cardiac Cath 5/18/16:  Anatomy:   LM-normal  LAD-50% mid, 70% distal at the apex  D1-50% ostial, 70% proximal  Cx-99% mid with left to left collaterals  RCA-70% mid long, 99% distal hazy  RPDA- patent  LVEF- 50 % with inferior hypokinesis, no significant mitral regurgitation  PCI: Circumflex 99% to 0% mid with a 2.25 mm x 26 mm Medtronic resolute drug-eluting stent postdilated with a 2.5 mm noncompliant balloon. RCA 70% to 0% mid with 2.5 mm x 30 mm Medtronic resolute eluding stent and a 2.5 mm x 22 mm Medtronic resolute drug-eluting stent. The distal RCA is 99% to 0% with a 2.25 mm x 22 mm Medtronic resolute drug-eluting stent. Impression:  1. Severe multivessel coronary artery disease  2. Successful drug-eluting stent implantation in the RCA ×3 and circumflex ×1  3. Borderline severe CAD of the distal LAD at the apex in the proximal diagonal  4. Preserved LV systolic function     Plan:  1. Effient/equivalent for minimum of 1 year  2. Aspirin indefinitely  3. No beta blocker due to bradycardia  4. Attempted medical therapy of the distal LAD and diagonal. Cardiac risk stratification with exercise Myoview stress testing and approximately 3-6 months. ASSESSMENT/PLAN:  1. Coronary artery disease due to lipid rich plaque  - 5/18/16 [facility] - severe multivessel disease - s/p RHETT x 3 to RCA, RHETT x 1 to LCx.   Borderline distal LAD and diagonal disease.    -  9/5/18 Myoview GXT (c/o of SOB when walking longer distances) -- normal perfusion, normal EF.    -  treated with  Plavix, statin, ASA     Plan : Stable. Continue current medical regimen. 2. Essential hypertension  - Blood pressure 110/60, pulse 78, height 5' 7\" (1.702 m), weight 188 lb 1.6 oz (85.3 kg), SpO2 98 %. - Lisinopril 10 mg      Plan : Stable. Continue current medical regimen. 3. Dyslipidemia  -  treated with Lipitor 80 mg daily  -  6/23/21  -- T Chol - 118, TG- 103, HDL- 45, LDL- 52.    -  10/12/22  TG 84 HDL 44 LDL 63. LFT normal.        Plan: Stable. Continue current medical regimen. Fasting lipid profile, LFTs. 4.  Carotid artery disease  - 2/3/21 CTA of head and neck -- atherosclerosis in bilateral carotid bifurcations, approximately 50% stenosis at origin of bilateral ICAs      Plan: Stable. Continue current medical regimen. 5.  Fatigue  -  sleeps poorly at night. Referred to Dr. Tod Amaya, patient patient not interested in sleep evaluation. - 6/23/21 -  TSH 3/34    Plan : Stable. Continue current medical regimen. 6.  Short term memory loss   - 1/8/21 CT head wo contrast - showed atrophy and some small vessel disease.    - started on Aricept by Dr. Nena Monique 1/2021. Also on Namenda XR    Plan : Now diagnosed with Alzheimer's dementia. Per PCP. Plan:  1. Stable. Continue current medical regimen. 2.  Fasting lipid profile, LFTs yearly. 3.  RTC in 1 year. Scribe's Attestation: This note was scribed in the presence of Dr. Karen Pruitt MD by Tamy Anderson RN. Physician Attestation: The scribe's documentation has been prepared under my direction and personally reviewed by me in its entirety. I confirm that the note above accurately reflects all work, treatment, procedures, and medical decision making performed by me. An  electronic signature was used to authenticate this note. Tee Tate MD, [facility], [address]

## 2022-12-14 ENCOUNTER — OFFICE VISIT (OUTPATIENT)
Dept: CARDIOLOGY CLINIC | Age: 82
End: 2022-12-14
Payer: MEDICARE

## 2022-12-14 VITALS
SYSTOLIC BLOOD PRESSURE: 110 MMHG | OXYGEN SATURATION: 98 % | HEIGHT: 67 IN | WEIGHT: 188.1 LBS | HEART RATE: 78 BPM | DIASTOLIC BLOOD PRESSURE: 60 MMHG | BODY MASS INDEX: 29.52 KG/M2

## 2022-12-14 DIAGNOSIS — I25.83 CORONARY ARTERY DISEASE DUE TO LIPID RICH PLAQUE: ICD-10-CM

## 2022-12-14 DIAGNOSIS — I25.10 CORONARY ARTERY DISEASE DUE TO LIPID RICH PLAQUE: ICD-10-CM

## 2022-12-14 DIAGNOSIS — E78.5 DYSLIPIDEMIA: Primary | ICD-10-CM

## 2022-12-14 DIAGNOSIS — I10 ESSENTIAL HYPERTENSION: Chronic | ICD-10-CM

## 2022-12-14 PROCEDURE — 93000 ELECTROCARDIOGRAM COMPLETE: CPT | Performed by: INTERNAL MEDICINE

## 2022-12-14 PROCEDURE — 3074F SYST BP LT 130 MM HG: CPT | Performed by: INTERNAL MEDICINE

## 2022-12-14 PROCEDURE — 3078F DIAST BP <80 MM HG: CPT | Performed by: INTERNAL MEDICINE

## 2022-12-14 PROCEDURE — 1123F ACP DISCUSS/DSCN MKR DOCD: CPT | Performed by: INTERNAL MEDICINE

## 2022-12-14 PROCEDURE — G8427 DOCREV CUR MEDS BY ELIG CLIN: HCPCS | Performed by: INTERNAL MEDICINE

## 2022-12-14 PROCEDURE — G8484 FLU IMMUNIZE NO ADMIN: HCPCS | Performed by: INTERNAL MEDICINE

## 2022-12-14 PROCEDURE — G8417 CALC BMI ABV UP PARAM F/U: HCPCS | Performed by: INTERNAL MEDICINE

## 2022-12-14 PROCEDURE — 1036F TOBACCO NON-USER: CPT | Performed by: INTERNAL MEDICINE

## 2022-12-14 PROCEDURE — 99214 OFFICE O/P EST MOD 30 MIN: CPT | Performed by: INTERNAL MEDICINE

## 2023-03-22 DIAGNOSIS — I25.83 CORONARY ARTERY DISEASE DUE TO LIPID RICH PLAQUE: ICD-10-CM

## 2023-03-22 DIAGNOSIS — I25.10 CORONARY ARTERY DISEASE DUE TO LIPID RICH PLAQUE: ICD-10-CM

## 2023-03-22 DIAGNOSIS — E78.5 DYSLIPIDEMIA: ICD-10-CM

## 2023-03-22 RX ORDER — ATORVASTATIN CALCIUM 80 MG/1
TABLET, FILM COATED ORAL
Qty: 90 TABLET | Refills: 3 | Status: SHIPPED | OUTPATIENT
Start: 2023-03-22

## 2023-05-17 ENCOUNTER — OFFICE VISIT (OUTPATIENT)
Dept: FAMILY MEDICINE CLINIC | Age: 83
End: 2023-05-17

## 2023-05-17 VITALS
BODY MASS INDEX: 29.44 KG/M2 | HEART RATE: 81 BPM | SYSTOLIC BLOOD PRESSURE: 126 MMHG | WEIGHT: 188 LBS | DIASTOLIC BLOOD PRESSURE: 75 MMHG | TEMPERATURE: 97.1 F

## 2023-05-17 DIAGNOSIS — N18.31 STAGE 3A CHRONIC KIDNEY DISEASE (HCC): ICD-10-CM

## 2023-05-17 DIAGNOSIS — I10 ESSENTIAL HYPERTENSION: Primary | Chronic | ICD-10-CM

## 2023-05-17 DIAGNOSIS — F03.90 DEMENTIA WITHOUT BEHAVIORAL DISTURBANCE (HCC): ICD-10-CM

## 2023-05-17 SDOH — ECONOMIC STABILITY: FOOD INSECURITY: WITHIN THE PAST 12 MONTHS, YOU WORRIED THAT YOUR FOOD WOULD RUN OUT BEFORE YOU GOT MONEY TO BUY MORE.: NEVER TRUE

## 2023-05-17 SDOH — ECONOMIC STABILITY: FOOD INSECURITY: WITHIN THE PAST 12 MONTHS, THE FOOD YOU BOUGHT JUST DIDN'T LAST AND YOU DIDN'T HAVE MONEY TO GET MORE.: NEVER TRUE

## 2023-05-17 SDOH — ECONOMIC STABILITY: INCOME INSECURITY: HOW HARD IS IT FOR YOU TO PAY FOR THE VERY BASICS LIKE FOOD, HOUSING, MEDICAL CARE, AND HEATING?: NOT HARD AT ALL

## 2023-05-17 SDOH — ECONOMIC STABILITY: HOUSING INSECURITY
IN THE LAST 12 MONTHS, WAS THERE A TIME WHEN YOU DID NOT HAVE A STEADY PLACE TO SLEEP OR SLEPT IN A SHELTER (INCLUDING NOW)?: NO

## 2023-05-17 ASSESSMENT — PATIENT HEALTH QUESTIONNAIRE - PHQ9
2. FEELING DOWN, DEPRESSED OR HOPELESS: 0
SUM OF ALL RESPONSES TO PHQ QUESTIONS 1-9: 0
SUM OF ALL RESPONSES TO PHQ9 QUESTIONS 1 & 2: 0
1. LITTLE INTEREST OR PLEASURE IN DOING THINGS: 0

## 2023-05-17 NOTE — PROGRESS NOTES
Jmaes Engle is a 81 y.o. male who presents for follow-up of HTN and dementia. Checks BP at home: Yes  BP numbers: 130/80's  Taking medication daily: Yes (lisinopril)  Side Effects of medication: no      Memory - Patient feels like memory is ok. Wife states it is getting worse. States sits in chair all day, won't do anything. Won't bathe, has to harp on him to do it. No trouble getting dressed but wants to wear same clothes every day. No toileting issues. Appetite down some. Wife puts meds out but sometimes has to harass him to take. Still driving, wife states does ok during day but at night states lights bother him, she won't let him drive on own at night. Pt denies depression, wife feels like he doesn't want to do anything, concerned about depression. When asked date says May 2022, doesn't know day or date. Doesn't know who the president is. Reads the paper every day - several times a day per wife. Knows in doctor's office. [facility] - last year, told needed cataract surgery. Body mass index is 29.44 kg/m².   Vitals:    05/17/23 1108   BP: 126/75   Pulse: 81   Temp: 97.1 °F (36.2 °C)   Weight: 188 lb (85.3 kg)     Wt Readings from Last 3 Encounters:   05/17/23 188 lb (85.3 kg)   12/14/22 188 lb 1.6 oz (85.3 kg)   10/31/22 191 lb 12.8 oz (87 kg)     Today's PHQ:    PHQ Scores 5/17/2023 10/31/2022 8/9/2021 1/4/2021 2/19/2020 1/21/2020 4/11/2018   PHQ2 Score 0 0 0 0 0 0 0   PHQ9 Score 0 0 0 0 0 0 0     Interpretation of Total Score Depression Severity: 1-4 = Minimal depression, 5-9 = Mild depression, 10-14 = Moderate depression, 15-19 = Moderately severe depression, 20-27 = Severe depression     PHQ-9 Total Score: 0 (5/17/2023 11:06 AM)      Interpretation of Total Score Depression Severity: 1-4 = Minimal depression, 5-9 = Mild depression, 10-14 = Moderate depression, 15-19 = Moderately severe depression, 20-27 = Severe depression     GENERAL:Alert and oriented to person and

## 2023-05-17 NOTE — PATIENT INSTRUCTIONS
--Make appointment to see the eye doctor       --[address] [place] [address]  [email or web address]    Where to Start? Call [phone] ([phone]) to speak with a nurse or  who can answer questions, explain services and assess your needs. When you call [facility]  One of our intake specialists will ask you a few questions about your health, your living arrangements and your income and medical expenses. They will also ask you for:  Social security number   Insurance information, such as Medicare and/or Medicaid number; name and policy number of supplemental or long-term care insurance   Name of an emergency contact or caregiver  What happens after that? A  will arrange an in-home visit to verify your eligibility and set up your services. Our Services  Eligibility for each service is determine by a care manager during a home visit. Adult Day Services: Structured programs provide a secure, stimulating environment  and respite for caregivers. Transportation may be available. Care Management: A registered nurse or licensed social worker coordinates your services and monitors your care. Consumer Directed Care: Allows you to hire a friend or family member to provide home care services. Electronic Monitoring System: 24-hour, inhome monitoring systems that provide safety and/or medication alerts. Environmental Services: Pest control, major housecleaning and waste removal.   Home Care Assistance: A trained aide helps with housekeeping, personal care and other daily activities, and provides respite (time off) for the primary caregiver. Home Modifications and Repairs: Minor repairs and safety upgrades such as ramps, grab bars and dead-bolt locks. Independent Living Assistance: Help with benefit applications, and organizing personal and household records. Meals and Nutrition Counseling: Homedelivered meals and help understanding your special dietary needs.

## 2023-06-07 LAB
ANION GAP 4: 8 MMOL/L (ref 7–16)
BUN BLDV-MCNC: 20 MG/DL (ref 7–25)
CALCIUM SERPL-MCNC: 9.1 MG/DL (ref 8.6–10.2)
CHLORIDE BLD-SCNC: 104 MMOL/L (ref 98–107)
CO2: 29 MMOL/L (ref 21–31)
CREAT SERPL-MCNC: 1.32 MG/DL (ref 0.7–1.3)
EGFR MALE: 54 ML/MIN/1.73M2
GLUCOSE: 98 MG/DL (ref 74–109)
POTASSIUM SERPL-SCNC: 3.9 MMOL/L (ref 3.5–5.1)
SODIUM BLD-SCNC: 141 MMOL/L (ref 136–145)

## 2023-06-21 ENCOUNTER — HOSPITAL ENCOUNTER (OUTPATIENT)
Dept: OCCUPATIONAL THERAPY | Age: 83
Setting detail: THERAPIES SERIES
Discharge: HOME | End: 2023-06-21
Attending: FAMILY MEDICINE
Payer: MEDICARE

## 2023-06-21 PROCEDURE — 97165 OT EVAL LOW COMPLEX 30 MIN: CPT

## 2023-06-21 PROCEDURE — 97537 COMMUNITY/WORK REINTEGRATION: CPT
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
## 2023-07-26 ENCOUNTER — OFFICE VISIT (OUTPATIENT)
Dept: DERMATOLOGY | Age: 83
End: 2023-07-26

## 2023-07-26 DIAGNOSIS — L82.1 SK (SEBORRHEIC KERATOSIS): Primary | ICD-10-CM

## 2023-07-26 DIAGNOSIS — L85.3 XEROSIS CUTIS: ICD-10-CM

## 2023-07-26 DIAGNOSIS — L82.0 INFLAMED SEBORRHEIC KERATOSIS: ICD-10-CM

## 2023-10-04 DIAGNOSIS — I10 ESSENTIAL HYPERTENSION: Chronic | ICD-10-CM

## 2023-10-04 RX ORDER — CLOPIDOGREL BISULFATE 75 MG/1
TABLET ORAL
Qty: 90 TABLET | Refills: 3 | Status: SHIPPED | OUTPATIENT
Start: 2023-10-04

## 2023-10-04 RX ORDER — LISINOPRIL 10 MG/1
TABLET ORAL
Qty: 90 TABLET | Refills: 3 | Status: SHIPPED | OUTPATIENT
Start: 2023-10-04

## 2023-10-04 NOTE — TELEPHONE ENCOUNTER
Medication:   Requested Prescriptions     Pending Prescriptions Disp Refills    clopidogrel (PLAVIX) 75 MG tablet [Pharmacy Med Name: CLOPIDOGREL 75 MG Tablet] 90 tablet 3     Sig: TAKE 1 TABLET EVERY DAY    lisinopril (PRINIVIL;ZESTRIL) 10 MG tablet [Pharmacy Med Name: LISINOPRIL 10 MG Tablet] 90 tablet 3     Sig: TAKE 1 TABLET EVERY DAY          Patient Phone Number: [phone] (home) [phone] (work)    Last appt: 5/17/2023   Next appt: 11/22/2023    Last OARRS:        No data to display              PDMP Monitoring:    Last PDMP Silvestre Villagran as Reviewed [facility]):  Review User Review Instant Review Result          Preferred Pharmacy:   [facility] [phone] - F [phone]  [address] [address]  Phone: [phone] Fax: [phone]    Lucinda Soler [id number] [facility] [phone] John Khoury [phone]  [address] [facility] [address]  Phone: [phone] Fax: [phone]

## 2023-10-16 RX ORDER — DONEPEZIL HYDROCHLORIDE 10 MG/1
10 TABLET, FILM COATED ORAL
Qty: 90 TABLET | Refills: 10 | Status: SHIPPED | OUTPATIENT
Start: 2023-10-16

## 2023-10-16 NOTE — TELEPHONE ENCOUNTER
Medication:   Requested Prescriptions     Pending Prescriptions Disp Refills    donepezil (ARICEPT) 10 MG tablet [Pharmacy Med Name: DONEPEZIL HCL 10 MG Tablet] 90 tablet 10     Sig: TAKE 1 TABLET EVERY NIGHT          Patient Phone Number: [phone] (home) [phone] (work)    Last appt: 5/17/2023   Next appt: 11/22/2023    Last OARRS:        No data to display              PDMP Monitoring:    Last PDMP Eric Louis as Reviewed [facility]):  Review User Review Instant Review Result          Preferred Pharmacy:   [facility] [phone] - F [phone]  [address] [place] [address]  Phone: [phone] Fax: [phone]    [facility] [id number] [facility] [phone] Aurora May [phone]  [address] [place] [address] [address]  Phone: [phone] Fax: [phone]

## 2023-11-06 RX ORDER — MEMANTINE HYDROCHLORIDE 28 MG/1
CAPSULE, EXTENDED RELEASE ORAL
Qty: 90 CAPSULE | Refills: 10 | Status: SHIPPED | OUTPATIENT
Start: 2023-11-06

## 2023-11-06 NOTE — TELEPHONE ENCOUNTER
Medication:   Requested Prescriptions     Pending Prescriptions Disp Refills    memantine ER (NAMENDA XR) 28 MG CP24 extended release capsule [Pharmacy Med Name: MEMANTINE HYDROCHLORIDE ER 28 MG Capsule Extended Release 24 Hour] 90 capsule 10     Sig: TAKE [address] DAY          Patient Phone Number: [phone] (home) [phone] (work)    Last appt: 5/17/2023   Next appt: 11/22/2023    Last OARRS:        No data to display              PDMP Monitoring:    Last PDMP Kaylyn Navas as Reviewed [facility]):  Review User Review Instant Review Result          Preferred Pharmacy:   [facility] [phone] - F [phone]  [address] [facility] [address] [address]  Phone: [phone] Fax: [phone]    [facility] [id number] [address] [phone] Unknown Phy [phone]  [address] [place] [address]  Phone: [phone] Fax: [phone]

## 2023-11-22 ENCOUNTER — OFFICE VISIT (OUTPATIENT)
Dept: FAMILY MEDICINE CLINIC | Age: 83
End: 2023-11-22
Payer: MEDICARE

## 2023-11-22 VITALS
HEIGHT: 67 IN | OXYGEN SATURATION: 97 % | BODY MASS INDEX: 29.82 KG/M2 | DIASTOLIC BLOOD PRESSURE: 64 MMHG | RESPIRATION RATE: 16 BRPM | WEIGHT: 190 LBS | SYSTOLIC BLOOD PRESSURE: 124 MMHG | TEMPERATURE: 97.2 F | HEART RATE: 63 BPM

## 2023-11-22 DIAGNOSIS — E78.5 DYSLIPIDEMIA: ICD-10-CM

## 2023-11-22 DIAGNOSIS — I10 ESSENTIAL HYPERTENSION: ICD-10-CM

## 2023-11-22 DIAGNOSIS — Z00.00 WELL ADULT EXAM: Primary | ICD-10-CM

## 2023-11-22 DIAGNOSIS — M25.561 ACUTE PAIN OF RIGHT KNEE: ICD-10-CM

## 2023-11-22 DIAGNOSIS — Z23 NEED FOR VACCINATION: ICD-10-CM

## 2023-11-22 DIAGNOSIS — F03.90 DEMENTIA WITHOUT BEHAVIORAL DISTURBANCE (HCC): ICD-10-CM

## 2023-11-22 PROCEDURE — G8484 FLU IMMUNIZE NO ADMIN: HCPCS | Performed by: FAMILY MEDICINE

## 2023-11-22 PROCEDURE — 3078F DIAST BP <80 MM HG: CPT | Performed by: FAMILY MEDICINE

## 2023-11-22 PROCEDURE — 90694 VACC AIIV4 NO PRSRV 0.5ML IM: CPT | Performed by: FAMILY MEDICINE

## 2023-11-22 PROCEDURE — G8417 CALC BMI ABV UP PARAM F/U: HCPCS | Performed by: FAMILY MEDICINE

## 2023-11-22 PROCEDURE — 3074F SYST BP LT 130 MM HG: CPT | Performed by: FAMILY MEDICINE

## 2023-11-22 PROCEDURE — G0439 PPPS, SUBSEQ VISIT: HCPCS | Performed by: FAMILY MEDICINE

## 2023-11-22 PROCEDURE — 1036F TOBACCO NON-USER: CPT | Performed by: FAMILY MEDICINE

## 2023-11-22 PROCEDURE — G8427 DOCREV CUR MEDS BY ELIG CLIN: HCPCS | Performed by: FAMILY MEDICINE

## 2023-11-22 PROCEDURE — G0008 ADMIN INFLUENZA VIRUS VAC: HCPCS | Performed by: FAMILY MEDICINE

## 2023-11-22 PROCEDURE — 99214 OFFICE O/P EST MOD 30 MIN: CPT | Performed by: FAMILY MEDICINE

## 2023-11-22 PROCEDURE — 1123F ACP DISCUSS/DSCN MKR DOCD: CPT | Performed by: FAMILY MEDICINE

## 2023-11-22 ASSESSMENT — PATIENT HEALTH QUESTIONNAIRE - PHQ9
1. LITTLE INTEREST OR PLEASURE IN DOING THINGS: 0
SUM OF ALL RESPONSES TO PHQ9 QUESTIONS 1 & 2: 0
SUM OF ALL RESPONSES TO PHQ QUESTIONS 1-9: 0
2. FEELING DOWN, DEPRESSED OR HOPELESS: 0
SUM OF ALL RESPONSES TO PHQ QUESTIONS 1-9: 0

## 2023-11-22 ASSESSMENT — LIFESTYLE VARIABLES
HOW MANY STANDARD DRINKS CONTAINING ALCOHOL DO YOU HAVE ON A TYPICAL DAY: 1 OR 2
HOW OFTEN DO YOU HAVE A DRINK CONTAINING ALCOHOL: MONTHLY OR LESS

## 2023-11-22 NOTE — PROGRESS NOTES
Yasmin VISIT    Patient is here for their Medicare Annual Wellness Visi. Here with wife. Last eye exam: a year ago  Last dental exam: a year ago  Exercise: none per wife  Diet: in general, an \"unhealthy\" diet - appetite is down, eats out a lot    How would you rate your overall health? : Very good per patient            11/22/2023    10:31 AM 10/31/2022     4:47 PM 8/9/2021     4:01 PM 1/4/2021     2:41 PM 2/19/2020     2:01 PM 1/21/2020     8:15 AM 4/11/2018     8:59 AM   Fall Risk   2 or more falls in past year? no no no no no no no   Fall with injury in past year? no no no no no no no           11/22/2023    10:32 AM 5/17/2023    11:06 AM 10/31/2022     4:47 PM 8/9/2021     4:01 PM 1/4/2021     2:41 PM 2/19/2020     2:01 PM 1/21/2020     8:15 AM   PHQ Scores   PHQ2 Score 0 0 0 0 0 0 0   PHQ9 Score 0 0 0 0 0 0 0         Do you always wear a seat belt in the car?: No      Have you noted any problems with hearing?: No  Have you noted any vision problems?: patient states ok but wife says they told him at eye doc last year had bad cataracts but he refuses to get done  Do you have concerns about your sexual health?: no  In the past month how much has pain been an issue for you?:  A little bit - right knee  In the past month have you had issues with anxiety, loneliness, irritability or fatigue:  Not at all    Do you take opioid medications even sometimes? No     Living Will and/or Healthcare POA: Yes,   Copy on file      Healthcare Decision Maker:    Primary Decision Maker: Josy lyme - [phone]    Secondary Decision Maker: Matt Dow - Child  Click here to complete Healthcare Decision Makers including selection of the Healthcare Decision Maker Relationship (ie \"Primary\"). Who lives at home with you: wife  Do you have any pets? none  Do you have any services coming to your home (meals on wheels, home health, etc) ? : no but wife reports could use help      Do you

## 2023-11-23 LAB
ALBUMIN SERPL-MCNC: 4.3 G/DL (ref 3.4–5)
ALBUMIN/GLOB SERPL: 1.4 {RATIO} (ref 1.1–2.2)
ALP SERPL-CCNC: 126 U/L (ref 40–129)
ALT SERPL-CCNC: 34 U/L (ref 10–40)
ANION GAP SERPL CALCULATED.3IONS-SCNC: 10 MMOL/L (ref 3–16)
AST SERPL-CCNC: 25 U/L (ref 15–37)
BILIRUB DIRECT SERPL-MCNC: <0.2 MG/DL (ref 0–0.3)
BILIRUB INDIRECT SERPL-MCNC: NORMAL MG/DL (ref 0–1)
BILIRUB SERPL-MCNC: 0.8 MG/DL (ref 0–1)
BUN SERPL-MCNC: 24 MG/DL (ref 7–20)
CALCIUM SERPL-MCNC: 9.4 MG/DL (ref 8.3–10.6)
CHLORIDE SERPL-SCNC: 100 MMOL/L (ref 99–110)
CHOLEST SERPL-MCNC: 139 MG/DL (ref 0–199)
CO2 SERPL-SCNC: 28 MMOL/L (ref 21–32)
CREAT SERPL-MCNC: 1.3 MG/DL (ref 0.8–1.3)
GFR SERPLBLD CREATININE-BSD FMLA CKD-EPI: 55 ML/MIN/{1.73_M2}
GLUCOSE SERPL-MCNC: 99 MG/DL (ref 70–99)
HDLC SERPL-MCNC: 46 MG/DL (ref 40–60)
LDLC SERPL CALC-MCNC: 71 MG/DL
POTASSIUM SERPL-SCNC: 4.2 MMOL/L (ref 3.5–5.1)
PROT SERPL-MCNC: 7.3 G/DL (ref 6.4–8.2)
SODIUM SERPL-SCNC: 138 MMOL/L (ref 136–145)
TRIGL SERPL-MCNC: 109 MG/DL (ref 0–150)
VLDLC SERPL CALC-MCNC: 22 MG/DL

## 2023-12-06 ENCOUNTER — OFFICE VISIT (OUTPATIENT)
Dept: CARDIOLOGY CLINIC | Age: 83
End: 2023-12-06
Payer: MEDICARE

## 2023-12-06 VITALS
HEART RATE: 63 BPM | OXYGEN SATURATION: 95 % | DIASTOLIC BLOOD PRESSURE: 54 MMHG | SYSTOLIC BLOOD PRESSURE: 110 MMHG | HEIGHT: 68 IN | BODY MASS INDEX: 28.49 KG/M2 | WEIGHT: 188 LBS

## 2023-12-06 DIAGNOSIS — I25.10 CORONARY ARTERY DISEASE DUE TO LIPID RICH PLAQUE: Primary | ICD-10-CM

## 2023-12-06 DIAGNOSIS — I65.23 BILATERAL CAROTID ARTERY STENOSIS: ICD-10-CM

## 2023-12-06 DIAGNOSIS — E78.5 DYSLIPIDEMIA: ICD-10-CM

## 2023-12-06 DIAGNOSIS — I10 ESSENTIAL HYPERTENSION: Chronic | ICD-10-CM

## 2023-12-06 DIAGNOSIS — I25.83 CORONARY ARTERY DISEASE DUE TO LIPID RICH PLAQUE: Primary | ICD-10-CM

## 2023-12-06 PROCEDURE — 1036F TOBACCO NON-USER: CPT | Performed by: INTERNAL MEDICINE

## 2023-12-06 PROCEDURE — 1123F ACP DISCUSS/DSCN MKR DOCD: CPT | Performed by: INTERNAL MEDICINE

## 2023-12-06 PROCEDURE — G8484 FLU IMMUNIZE NO ADMIN: HCPCS | Performed by: INTERNAL MEDICINE

## 2023-12-06 PROCEDURE — 99214 OFFICE O/P EST MOD 30 MIN: CPT | Performed by: INTERNAL MEDICINE

## 2023-12-06 PROCEDURE — G8427 DOCREV CUR MEDS BY ELIG CLIN: HCPCS | Performed by: INTERNAL MEDICINE

## 2023-12-06 PROCEDURE — G8417 CALC BMI ABV UP PARAM F/U: HCPCS | Performed by: INTERNAL MEDICINE

## 2023-12-06 PROCEDURE — 3074F SYST BP LT 130 MM HG: CPT | Performed by: INTERNAL MEDICINE

## 2023-12-06 PROCEDURE — 3078F DIAST BP <80 MM HG: CPT | Performed by: INTERNAL MEDICINE

## 2023-12-15 NOTE — TELEPHONE ENCOUNTER
Wife is stating that her  is really sick. She checked his Oxygen and it was 90-93. He has a fever 100.1. She states he won't stay home. He took the dog to the dog park yesterday.         Wife is getting really upset NEGATIVE

## 2024-04-08 DIAGNOSIS — I25.10 CORONARY ARTERY DISEASE DUE TO LIPID RICH PLAQUE: ICD-10-CM

## 2024-04-08 DIAGNOSIS — E78.5 DYSLIPIDEMIA: ICD-10-CM

## 2024-04-08 DIAGNOSIS — I25.83 CORONARY ARTERY DISEASE DUE TO LIPID RICH PLAQUE: ICD-10-CM

## 2024-04-08 RX ORDER — ATORVASTATIN CALCIUM 80 MG/1
TABLET, FILM COATED ORAL
Qty: 90 TABLET | Refills: 3 | Status: SHIPPED | OUTPATIENT
Start: 2024-04-08

## 2024-05-21 NOTE — PROGRESS NOTES
Ronald Henry is a 83 y.o. male who presents for follow-up of HTN.    Checks BP at home: occasionally  BP numbers: similar to today per wife  Taking medication daily: Yes - about 90% time, some  (lisinopril)  Side Effects of medication: no      Memory - taking aricept and namenda. Wife feels like getting worse. Per wife hard time getting around, lies around a lot, lies in bed or chair all day. Hard time getting him to take a bath - states took one today but had been weeks before that. No issues with toileting. Able to feed self ok but not eating as much. Able to get dressed by self. Will sometimes wear clothes for few days. Does brush teeth daily.       Wife reports about past month has had a \"cold\".  Sneezing, RN, congestion. Not really coughing, no fever, no SOB. Gave him a claritin today.       Body mass index is 28.4 kg/m².  Today's PHQ:        5/22/2024     9:32 AM 11/22/2023    10:32 AM 5/17/2023    11:06 AM 10/31/2022     4:47 PM 8/9/2021     4:01 PM 1/4/2021     2:41 PM 2/19/2020     2:01 PM   PHQ Scores   PHQ2 Score 0 0 0 0 0 0 0   PHQ9 Score 0 0 0 0 0 0 0     Interpretation of Total Score Depression Severity: 1-4 = Minimal depression, 5-9 = Mild depression, 10-14 = Moderate depression, 15-19 = Moderately severe depression, 20-27 = Severe depression   Vitals:    05/22/24 0932   BP: 114/62   Site: Left Upper Arm   Position: Sitting   Cuff Size: Medium Adult   Pulse: 62   Temp: 97.1 °F (36.2 °C)   TempSrc: Infrared   SpO2: 98%   Weight: 84.7 kg (186 lb 12.8 oz)     Wt Readings from Last 3 Encounters:   05/22/24 84.7 kg (186 lb 12.8 oz)   12/06/23 85.3 kg (188 lb)   11/22/23 86.2 kg (190 lb)         5/22/2024     9:32 AM 11/22/2023    10:32 AM 5/17/2023    11:06 AM 10/31/2022     4:47 PM 8/9/2021     4:01 PM 1/4/2021     2:41 PM 2/19/2020     2:01 PM   PHQ Scores   PHQ2 Score 0 0 0 0 0 0 0   PHQ9 Score 0 0 0 0 0 0 0       Interpretation of Total Score Depression Severity: 1-4 = Minimal depression, 5-9 = 
IV intact

## 2024-05-22 ENCOUNTER — OFFICE VISIT (OUTPATIENT)
Dept: FAMILY MEDICINE CLINIC | Age: 84
End: 2024-05-22

## 2024-05-22 VITALS
DIASTOLIC BLOOD PRESSURE: 62 MMHG | BODY MASS INDEX: 28.4 KG/M2 | OXYGEN SATURATION: 98 % | TEMPERATURE: 97.1 F | SYSTOLIC BLOOD PRESSURE: 114 MMHG | WEIGHT: 186.8 LBS | HEART RATE: 62 BPM

## 2024-05-22 DIAGNOSIS — J30.1 SEASONAL ALLERGIC RHINITIS DUE TO POLLEN: ICD-10-CM

## 2024-05-22 DIAGNOSIS — F03.90 DEMENTIA WITHOUT BEHAVIORAL DISTURBANCE (HCC): ICD-10-CM

## 2024-05-22 DIAGNOSIS — N18.31 STAGE 3A CHRONIC KIDNEY DISEASE (HCC): ICD-10-CM

## 2024-05-22 DIAGNOSIS — I10 ESSENTIAL HYPERTENSION: Chronic | ICD-10-CM

## 2024-05-22 DIAGNOSIS — I10 ESSENTIAL HYPERTENSION: Primary | Chronic | ICD-10-CM

## 2024-05-22 LAB
ANION GAP SERPL CALCULATED.3IONS-SCNC: 12 MMOL/L (ref 3–16)
BUN SERPL-MCNC: 22 MG/DL (ref 7–20)
CALCIUM SERPL-MCNC: 9.2 MG/DL (ref 8.3–10.6)
CHLORIDE SERPL-SCNC: 105 MMOL/L (ref 99–110)
CO2 SERPL-SCNC: 27 MMOL/L (ref 21–32)
CREAT SERPL-MCNC: 1.3 MG/DL (ref 0.8–1.3)
GFR SERPLBLD CREATININE-BSD FMLA CKD-EPI: 55 ML/MIN/{1.73_M2}
GLUCOSE SERPL-MCNC: 107 MG/DL (ref 70–99)
POTASSIUM SERPL-SCNC: 3.8 MMOL/L (ref 3.5–5.1)
SODIUM SERPL-SCNC: 144 MMOL/L (ref 136–145)

## 2024-05-22 RX ORDER — LORATADINE 10 MG/1
10 TABLET ORAL DAILY
Qty: 30 TABLET | Refills: 0 | COMMUNITY
Start: 2024-05-22 | End: 2024-06-21

## 2024-05-22 SDOH — ECONOMIC STABILITY: INCOME INSECURITY: HOW HARD IS IT FOR YOU TO PAY FOR THE VERY BASICS LIKE FOOD, HOUSING, MEDICAL CARE, AND HEATING?: NOT HARD AT ALL

## 2024-05-22 SDOH — ECONOMIC STABILITY: FOOD INSECURITY: WITHIN THE PAST 12 MONTHS, THE FOOD YOU BOUGHT JUST DIDN'T LAST AND YOU DIDN'T HAVE MONEY TO GET MORE.: NEVER TRUE

## 2024-05-22 SDOH — ECONOMIC STABILITY: FOOD INSECURITY: WITHIN THE PAST 12 MONTHS, YOU WORRIED THAT YOUR FOOD WOULD RUN OUT BEFORE YOU GOT MONEY TO BUY MORE.: NEVER TRUE

## 2024-05-22 ASSESSMENT — PATIENT HEALTH QUESTIONNAIRE - PHQ9
SUM OF ALL RESPONSES TO PHQ QUESTIONS 1-9: 0
SUM OF ALL RESPONSES TO PHQ QUESTIONS 1-9: 0
1. LITTLE INTEREST OR PLEASURE IN DOING THINGS: NOT AT ALL
SUM OF ALL RESPONSES TO PHQ QUESTIONS 1-9: 0
SUM OF ALL RESPONSES TO PHQ9 QUESTIONS 1 & 2: 0
2. FEELING DOWN, DEPRESSED OR HOPELESS: NOT AT ALL
SUM OF ALL RESPONSES TO PHQ QUESTIONS 1-9: 0

## 2024-05-22 NOTE — PATIENT INSTRUCTIONS
--Get your Covid vaccine this fall.      --Make sure you get your flu shot this fall. If you are over 65 look for the \"high dose\" flu shot for better protection.

## 2024-07-31 ENCOUNTER — OFFICE VISIT (OUTPATIENT)
Dept: DERMATOLOGY | Age: 84
End: 2024-07-31
Payer: MEDICARE

## 2024-07-31 DIAGNOSIS — L57.0 ACTINIC KERATOSIS: ICD-10-CM

## 2024-07-31 DIAGNOSIS — L82.1 SK (SEBORRHEIC KERATOSIS): Primary | ICD-10-CM

## 2024-07-31 DIAGNOSIS — L29.9 PRURITUS: ICD-10-CM

## 2024-07-31 PROCEDURE — 1036F TOBACCO NON-USER: CPT | Performed by: DERMATOLOGY

## 2024-07-31 PROCEDURE — 1123F ACP DISCUSS/DSCN MKR DOCD: CPT | Performed by: DERMATOLOGY

## 2024-07-31 PROCEDURE — G8417 CALC BMI ABV UP PARAM F/U: HCPCS | Performed by: DERMATOLOGY

## 2024-07-31 PROCEDURE — 99213 OFFICE O/P EST LOW 20 MIN: CPT | Performed by: DERMATOLOGY

## 2024-07-31 PROCEDURE — G8427 DOCREV CUR MEDS BY ELIG CLIN: HCPCS | Performed by: DERMATOLOGY

## 2024-07-31 RX ORDER — AMMONIUM LACTATE 12 G/100G
CREAM TOPICAL
Qty: 140 G | Refills: 4 | Status: SHIPPED | OUTPATIENT
Start: 2024-07-31 | End: 2024-08-30

## 2024-07-31 NOTE — PROGRESS NOTES
[facility]  Amish Renteria MD  [phone]      Ronald Henry  1940    83 y.o. male     Date of Visit: 7/31/2024    Chief Complaint: skin lesions    History of Present Illness:    Here today with wife, has dementia.     1.  Wife reports multiple growths on the back and upper extremities.     2.  He itches on and off on the back.    Dermatologic history:     He has a history of a nodular BCC on the left upper back-excised on 8/3/2018.      Review of Systems:  Gen: Feels well, good sense of health.      Past Medical History, Family History, Surgical History, Medications and Allergies reviewed.    Past Medical History:   Diagnosis Date    Actinic keratosis     Allergic rhinitis, cause unspecified     Degeneration of lumbar or lumbosacral intervertebral disc     Other abnormal blood chemistry     Routine general medical examination at a [facility] facility     S/P drug eluting coronary stent placement 5/19/2016    Circumflex     Status post insertion of drug-eluting stent into right coronary artery for coronary artery disease 5/19/2016    ×3     Unspecified essential hypertension      Past Surgical History:   Procedure Laterality Date    APPENDECTOMY      CORONARY ANGIOPLASTY WITH STENT PLACEMENT      x5    OTHER SURGICAL HISTORY  12/01/1998    basal cell CA-; back-removed 12/98        Allergies   Allergen Reactions    Pcn [Penicillins] Hives     Outpatient Medications Marked as Taking for the 7/31/24 encounter (Office Visit) with Amish Renteria MD   Medication Sig Dispense Refill    ammonium lactate (AMLACTIN) 12 % cream Apply to the back daily. 140 g 4    atorvastatin (LIPITOR) 80 MG tablet TAKE 1 TABLET EVERY DAY 90 tablet 3    memantine ER (NAMENDA XR) 28 MG CP24 extended release capsule TAKE 1 CAPSULE EVERY DAY 90 capsule 10    donepezil (ARICEPT) 10 MG tablet TAKE 1 TABLET EVERY NIGHT 90 tablet 10    clopidogrel (PLAVIX) 75 MG tablet TAKE 1 TABLET EVERY DAY 90 tablet 3

## 2024-10-09 DIAGNOSIS — I10 ESSENTIAL HYPERTENSION: Primary | Chronic | ICD-10-CM

## 2024-10-09 RX ORDER — CLOPIDOGREL BISULFATE 75 MG/1
TABLET ORAL
Qty: 90 TABLET | Refills: 0 | Status: SHIPPED | OUTPATIENT
Start: 2024-10-09

## 2024-10-09 RX ORDER — LISINOPRIL 10 MG/1
TABLET ORAL
Qty: 90 TABLET | Refills: 0 | Status: SHIPPED | OUTPATIENT
Start: 2024-10-09

## 2024-10-28 DIAGNOSIS — F03.90 DEMENTIA WITHOUT BEHAVIORAL DISTURBANCE (HCC): Primary | ICD-10-CM

## 2024-10-28 RX ORDER — DONEPEZIL HYDROCHLORIDE 10 MG/1
10 TABLET, FILM COATED ORAL
Qty: 90 TABLET | Refills: 3 | Status: SHIPPED | OUTPATIENT
Start: 2024-10-28

## 2024-11-14 DIAGNOSIS — F03.90 DEMENTIA WITHOUT BEHAVIORAL DISTURBANCE (HCC): Primary | ICD-10-CM

## 2024-11-14 RX ORDER — MEMANTINE HYDROCHLORIDE 28 MG/1
CAPSULE, EXTENDED RELEASE ORAL
Qty: 90 CAPSULE | Refills: 0 | Status: SHIPPED | OUTPATIENT
Start: 2024-11-14

## 2024-11-27 ENCOUNTER — OFFICE VISIT (OUTPATIENT)
Dept: FAMILY MEDICINE CLINIC | Age: 84
End: 2024-11-27

## 2024-11-27 VITALS
SYSTOLIC BLOOD PRESSURE: 130 MMHG | WEIGHT: 190.8 LBS | TEMPERATURE: 97.1 F | DIASTOLIC BLOOD PRESSURE: 86 MMHG | BODY MASS INDEX: 30.67 KG/M2 | OXYGEN SATURATION: 98 % | HEIGHT: 66 IN | HEART RATE: 70 BPM

## 2024-11-27 DIAGNOSIS — I10 ESSENTIAL HYPERTENSION: Chronic | ICD-10-CM

## 2024-11-27 DIAGNOSIS — Z00.00 WELL ADULT EXAM: Primary | ICD-10-CM

## 2024-11-27 DIAGNOSIS — Z91.148 NON COMPLIANCE W MEDICATION REGIMEN: ICD-10-CM

## 2024-11-27 DIAGNOSIS — Z23 NEED FOR VACCINATION: ICD-10-CM

## 2024-11-27 DIAGNOSIS — N18.31 STAGE 3A CHRONIC KIDNEY DISEASE (HCC): ICD-10-CM

## 2024-11-27 DIAGNOSIS — F03.90 DEMENTIA WITHOUT BEHAVIORAL DISTURBANCE (HCC): ICD-10-CM

## 2024-11-27 ASSESSMENT — PATIENT HEALTH QUESTIONNAIRE - PHQ9
SUM OF ALL RESPONSES TO PHQ QUESTIONS 1-9: 0
SUM OF ALL RESPONSES TO PHQ9 QUESTIONS 1 & 2: 0
2. FEELING DOWN, DEPRESSED OR HOPELESS: NOT AT ALL
1. LITTLE INTEREST OR PLEASURE IN DOING THINGS: NOT AT ALL
SUM OF ALL RESPONSES TO PHQ QUESTIONS 1-9: 0

## 2024-11-27 NOTE — PROGRESS NOTES
MEDICARE ANNUAL WELLNESS VISIT    Patient is here for their Medicare Annual Wellness Visit.  Patient is stating he is fine.  Wife states that patient has been refusing to take his medication other than his aspirin.     Last eye exam: Been awhile, plans on scheduling one in Jan  Last dental exam: twice a year, has appointment in Jan  Exercise:  never, activities of daily living only  Do you eat balanced/healthy meals regularly? Yes    How would you rate your overall health? : Very good            11/27/2024    10:54 AM 11/22/2023    10:31 AM 10/31/2022     4:47 PM 8/9/2021     4:01 PM 1/4/2021     2:41 PM 2/19/2020     2:01 PM 1/21/2020     8:15 AM   Fall Risk   Do you feel unsteady or are you worried about falling?  no no no       2 or more falls in past year? no no no no no no no   Fall with injury in past year? no no no no no no no           11/27/2024    10:54 AM 5/22/2024     9:32 AM 11/22/2023    10:32 AM 5/17/2023    11:06 AM 10/31/2022     4:47 PM 8/9/2021     4:01 PM 1/4/2021     2:41 PM   PHQ Scores   PHQ2 Score 0 0 0 0 0 0 0   PHQ9 Score 0 0 0 0 0 0 0         Do you always wear a seat belt in the car?: No      Have you noted any problems with hearing?: No  Have you noted any vision problems?: Yes  Do you have concerns about your sexual health?: no  In the past month how much has pain been an issue for you?:  Not at all, per patient, but wife states that he complains about his back a lot  In the past month have you had issues with anxiety, loneliness, irritability or fatigue:  Not at all    Do you take opioid medications even sometimes? No     Living Will and/or Healthcare POA: Yes,   Copy on file      Healthcare Decision Maker:    Primary Decision Maker: Kimberly Henry - Spouse - [phone]    Secondary Decision Maker: Ronald Henry  - Child - [phone]           Who lives at home with you: wife  Do you have any pets? none  Do you have any services coming to your home (meals on wheels, home

## 2024-11-27 NOTE — PROGRESS NOTES
Immunization(s) given during visit:     Immunizations Administered       Name Date Dose Route    Influenza, FLUAD, (age 65 y+), IM, Trivalent PF, 0.5mL 11/27/2024 0.5 mL Intramuscular    Site: Deltoid- Left    Lot: [id number]    NDC: 69224-232-66             Patient instructed to remain in clinic for 20 minutes after injection and was advised to report any adverse reaction to me immediately.

## 2024-11-27 NOTE — PATIENT INSTRUCTIONS
--Make appointment to see the eye doctor     --Check with your pharmacy about getting the RSV vaccine this fall

## 2024-11-28 LAB
ANION GAP SERPL CALCULATED.3IONS-SCNC: 13 MMOL/L (ref 3–16)
BUN SERPL-MCNC: 19 MG/DL (ref 7–20)
CALCIUM SERPL-MCNC: 9.7 MG/DL (ref 8.3–10.6)
CHLORIDE SERPL-SCNC: 102 MMOL/L (ref 99–110)
CO2 SERPL-SCNC: 25 MMOL/L (ref 21–32)
CREAT SERPL-MCNC: 1.3 MG/DL (ref 0.8–1.3)
GFR SERPLBLD CREATININE-BSD FMLA CKD-EPI: 55 ML/MIN/{1.73_M2}
GLUCOSE SERPL-MCNC: 95 MG/DL (ref 70–99)
POTASSIUM SERPL-SCNC: 4 MMOL/L (ref 3.5–5.1)
SODIUM SERPL-SCNC: 140 MMOL/L (ref 136–145)

## 2024-12-12 NOTE — PROGRESS NOTES
low risk scan.    Cardiac Cath 5/18/16:  Anatomy:   LM-normal  LAD-50% mid, 70% distal at the apex  D1-50% ostial, 70% proximal  Cx-99% mid with left to left collaterals  RCA-70% mid long, 99% distal hazy  RPDA- patent  LVEF- 50 % with inferior hypokinesis, no significant mitral regurgitation  PCI: Circumflex 99% to 0% mid with a 2.25 mm x 26 mm Medtronic resolute drug-eluting stent postdilated with a 2.5 mm noncompliant balloon. RCA 70% to 0% mid with 2.5 mm x 30 mm Medtronic resolute eluding stent and a 2.5 mm x 22 mm Medtronic resolute drug-eluting stent. The distal RCA is 99% to 0% with a 2.25 mm x 22 mm Medtronic resolute drug-eluting stent.     Impression:  1. Severe multivessel coronary artery disease  2. Successful drug-eluting stent implantation in the RCA ×3 and circumflex ×1  3. Borderline severe CAD of the distal LAD at the apex in the proximal diagonal  4. Preserved LV systolic function     Plan:  1. Effient/equivalent for minimum of 1 year  2. Aspirin indefinitely  3. No beta blocker due to bradycardia  4. Attempted medical therapy of the distal LAD and diagonal. Cardiac risk stratification with exercise Myoview stress testing and approximately 3-6 months.       ASSESSMENT/PLAN:  1. Coronary artery disease due to lipid rich plaque  - 5/18/16 [facility] - severe multivessel disease - s/p RHETT x 3 to RCA, RHETT x 1 to LCx.     - 9/5/18 Myoview GXT  normal perfusion, normal EF.    - not taking any medications     Plan : Stable. Continue current medication regimen.      2. Essential hypertension  Blood pressure 128/70, pulse 70, height 1.676 m (5' 5.98\"), weight 85.7 kg (189 lb), SpO2 96%.    Plan : wife reports it runs on the low side, no reports of dizziness/lightheadedness.     3. Dyslipidemia  11/22/23   HDL 46 LDL 71. LFT normal.    - patient refusing to take any medications     Plan: doing ok     4.  Carotid artery disease  - 2/3/21 CTA of head and neck -- atherosclerosis in bilateral carotid

## 2024-12-13 ENCOUNTER — OFFICE VISIT (OUTPATIENT)
Dept: CARDIOLOGY CLINIC | Age: 84
End: 2024-12-13
Payer: MEDICARE

## 2024-12-13 VITALS
HEART RATE: 70 BPM | WEIGHT: 189 LBS | OXYGEN SATURATION: 96 % | DIASTOLIC BLOOD PRESSURE: 70 MMHG | HEIGHT: 66 IN | SYSTOLIC BLOOD PRESSURE: 128 MMHG | BODY MASS INDEX: 30.37 KG/M2

## 2024-12-13 DIAGNOSIS — I10 ESSENTIAL HYPERTENSION: ICD-10-CM

## 2024-12-13 DIAGNOSIS — I25.10 CORONARY ARTERY DISEASE DUE TO LIPID RICH PLAQUE: Primary | ICD-10-CM

## 2024-12-13 DIAGNOSIS — I25.83 CORONARY ARTERY DISEASE DUE TO LIPID RICH PLAQUE: Primary | ICD-10-CM

## 2024-12-13 DIAGNOSIS — I65.23 BILATERAL CAROTID ARTERY STENOSIS: ICD-10-CM

## 2024-12-13 PROCEDURE — 93000 ELECTROCARDIOGRAM COMPLETE: CPT | Performed by: INTERNAL MEDICINE

## 2024-12-13 PROCEDURE — 3078F DIAST BP <80 MM HG: CPT | Performed by: INTERNAL MEDICINE

## 2024-12-13 PROCEDURE — 1036F TOBACCO NON-USER: CPT | Performed by: INTERNAL MEDICINE

## 2024-12-13 PROCEDURE — 99213 OFFICE O/P EST LOW 20 MIN: CPT | Performed by: INTERNAL MEDICINE

## 2024-12-13 PROCEDURE — G8427 DOCREV CUR MEDS BY ELIG CLIN: HCPCS | Performed by: INTERNAL MEDICINE

## 2024-12-13 PROCEDURE — 3074F SYST BP LT 130 MM HG: CPT | Performed by: INTERNAL MEDICINE

## 2024-12-13 PROCEDURE — G8482 FLU IMMUNIZE ORDER/ADMIN: HCPCS | Performed by: INTERNAL MEDICINE

## 2024-12-13 PROCEDURE — 1123F ACP DISCUSS/DSCN MKR DOCD: CPT | Performed by: INTERNAL MEDICINE

## 2024-12-13 PROCEDURE — G8417 CALC BMI ABV UP PARAM F/U: HCPCS | Performed by: INTERNAL MEDICINE

## 2024-12-13 PROCEDURE — 1159F MED LIST DOCD IN RCRD: CPT | Performed by: INTERNAL MEDICINE

## 2024-12-18 ENCOUNTER — HOSPITAL ENCOUNTER (OUTPATIENT)
Dept: OCCUPATIONAL THERAPY | Age: 84
Setting detail: THERAPIES SERIES
Discharge: HOME | End: 2024-12-18
Attending: FAMILY MEDICINE
Payer: MEDICARE

## 2024-12-18 ENCOUNTER — TELEPHONE (OUTPATIENT)
Dept: FAMILY MEDICINE CLINIC | Age: 84
End: 2024-12-18

## 2024-12-18 PROCEDURE — 97165 OT EVAL LOW COMPLEX 30 MIN: CPT

## 2024-12-18 PROCEDURE — 97537 COMMUNITY/WORK REINTEGRATION: CPT
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
## 2024-12-18 NOTE — PROGRESS NOTES
Occupational Therapy     Outpatient Occupational Therapy  [] [facility]   Phone: [phone]   Fax: [phone]   [x] [facility]  Phone: [phone]   Fax: [phone]  [] [facility]   Phone: [phone]   Fax: [phone]      To:  Dr. Cho      Patient: Ronald Henry  : 1940  MRN: [id number]  Evaluation Date: 2024      Diagnosis Information: Dementia            Occupational Therapy Certification/Re-Certification Form  Dear Dr. Cho  The following patient has been evaluated for occupational therapy services and for therapy to continue, Medicare requires monthly physician review of the treatment plan. Please review the attached evaluation and/or summary of the patient's plan of care, and verify that you agree therapy should continue by signing the attached document and sending it back to our office.    Plan of Care/Treatment to date:  [] Therapeutic Exercise   [] Modalities:  [] Therapeutic Activity    [] Ultrasound  [] Electrical Stimulation   [] Activities of Daily Living    [] Fluidotherapy [] Kinesiotaping  [] Neuromuscular Re-education   [] Iontophoresis [] Coldpack/hotpack   [] Instruction in HEP     [] Contrast Bath  [] Manual Therapy     Other:  [] Aquatic Therapy      [x] Cease driving.      Frequency/Duration:  # Days per week: [x] 1 day # Weeks: [x] 1 week [] 5 weeks      [] 2 days   [] 2 weeks [] 6 weeks     [] 3 days   [] 3 weeks [] 7 weeks     [] 4 days   [] 4 weeks [] 8 weeks    Rehab Potential: [] excellent [] good [] fair  [x] poor       Electronically signed by:  Gianna Mulligan, OT,OTR/L      If you have any questions or concerns, please don't hesitate to call.  Thank you for your referral.      Physician Signature:________________________________Date:__________________  By signing above, therapist’s plan is approved by physician

## 2025-01-24 ENCOUNTER — TELEPHONE (OUTPATIENT)
Dept: FAMILY MEDICINE CLINIC | Age: 85
End: 2025-01-24

## 2025-01-24 DIAGNOSIS — F03.90 DEMENTIA WITHOUT BEHAVIORAL DISTURBANCE (HCC): ICD-10-CM

## 2025-01-24 NOTE — TELEPHONE ENCOUNTER
Ok restart aricept 10mg daily  Not sure what pharmacy    Will discuss other memory medication at visit in May

## 2025-01-24 NOTE — TELEPHONE ENCOUNTER
Nurse states that they are doing therapy,started the PT yesterday, wife told them that patient has stopped all medications.  They said patient and family now agreeable to restart on of the memory medications.  They weren't sure which one he is to be on, or is he to take both.

## 2025-01-24 NOTE — TELEPHONE ENCOUNTER
[facility]. They are requesting a call so they can give a report on the patient. They also have some questions for provider.     Please Advise.   [phone]   Patient is having a procedure on June 4th, 2019  Patients employment will not have work for her to do in her condition with the scooter and boot. Patient is requesting work excuse be sent to:  [employer]  Leave and Disability   [address]  [place]. [address]  Phone [phone]  [phone]    ALSO she is requesting a work excuse for 05/28/19, she missed work due to swelling.  Please call back.

## 2025-01-24 NOTE — TELEPHONE ENCOUNTER
Attempted to call Cornelia, she was already gone.  Called patient's wife and informed her of the below, she already restarted the Aricept with patient.    While on the phone talking to her, she mentioned that the patient just fell going in [facility].  She plans on watching patient, and if he starts to show any symptoms of anything, they will go to ED.

## 2025-01-28 NOTE — PROGRESS NOTES
Ronald Wilcoxs is here today to follow up recent Hospital visit.  Was admitted to  [facility] on 1/19/25.   He did have another fall on 1/24/25 when walking into [facility].      Symptoms are has improved.  He states that he doesn't have any pain from the fall.  Wife states that he has fallen 6 times in the past couple of months.      Reviewed chart notes, labs and imaging in Care Everywhere and/or [facility].

## 2025-01-29 ENCOUNTER — OFFICE VISIT (OUTPATIENT)
Dept: FAMILY MEDICINE CLINIC | Age: 85
End: 2025-01-29

## 2025-01-29 VITALS
BODY MASS INDEX: 30.68 KG/M2 | SYSTOLIC BLOOD PRESSURE: 132 MMHG | DIASTOLIC BLOOD PRESSURE: 74 MMHG | OXYGEN SATURATION: 97 % | HEART RATE: 85 BPM | TEMPERATURE: 97.6 F | WEIGHT: 190 LBS

## 2025-01-29 DIAGNOSIS — R13.10 DYSPHAGIA, UNSPECIFIED TYPE: ICD-10-CM

## 2025-01-29 DIAGNOSIS — K13.79 BLOOD IN MOUTH OF UNKNOWN SOURCE: ICD-10-CM

## 2025-01-29 DIAGNOSIS — R26.81 UNSTEADY GAIT: ICD-10-CM

## 2025-01-29 DIAGNOSIS — N18.31 STAGE 3A CHRONIC KIDNEY DISEASE (HCC): ICD-10-CM

## 2025-01-29 DIAGNOSIS — F03.90 DEMENTIA WITHOUT BEHAVIORAL DISTURBANCE (HCC): ICD-10-CM

## 2025-01-29 DIAGNOSIS — R29.6 RECURRENT FALLS: ICD-10-CM

## 2025-01-29 DIAGNOSIS — Z86.16 HISTORY OF COVID-19: Primary | ICD-10-CM

## 2025-01-29 RX ORDER — MEMANTINE HYDROCHLORIDE 28 MG/1
CAPSULE, EXTENDED RELEASE ORAL DAILY
COMMUNITY

## 2025-01-29 RX ORDER — ASCORBIC ACID 500 MG
500 TABLET ORAL DAILY
COMMUNITY
Start: 2025-01-22 | End: 2025-01-29

## 2025-01-29 RX ORDER — UREA 10 %
1 LOTION (ML) TOPICAL DAILY
COMMUNITY
Start: 2025-01-21 | End: 2025-01-29 | Stop reason: ALTCHOICE

## 2025-01-29 RX ORDER — DONEPEZIL HYDROCHLORIDE 10 MG/1
10 TABLET, FILM COATED ORAL NIGHTLY
COMMUNITY

## 2025-01-29 SDOH — ECONOMIC STABILITY: FOOD INSECURITY: WITHIN THE PAST 12 MONTHS, YOU WORRIED THAT YOUR FOOD WOULD RUN OUT BEFORE YOU GOT MONEY TO BUY MORE.: NEVER TRUE

## 2025-01-29 SDOH — ECONOMIC STABILITY: FOOD INSECURITY: WITHIN THE PAST 12 MONTHS, THE FOOD YOU BOUGHT JUST DIDN'T LAST AND YOU DIDN'T HAVE MONEY TO GET MORE.: NEVER TRUE

## 2025-01-29 ASSESSMENT — PATIENT HEALTH QUESTIONNAIRE - PHQ9
1. LITTLE INTEREST OR PLEASURE IN DOING THINGS: NOT AT ALL
SUM OF ALL RESPONSES TO PHQ QUESTIONS 1-9: 0
SUM OF ALL RESPONSES TO PHQ QUESTIONS 1-9: 0
2. FEELING DOWN, DEPRESSED OR HOPELESS: NOT AT ALL
SUM OF ALL RESPONSES TO PHQ QUESTIONS 1-9: 0
SUM OF ALL RESPONSES TO PHQ QUESTIONS 1-9: 0
SUM OF ALL RESPONSES TO PHQ9 QUESTIONS 1 & 2: 0

## 2025-01-29 NOTE — PROGRESS NOTES
Patient here for hospital follow-up and recent fall.     Was at [facility] on 1/19/2025-1/21/2025 for covid. Admitted after falling and hitting face. Had been sig weaker than baseline as well. Found to be + for COVID. Treated supportively, did not get steroids or antivirals. Seen by neuro and recommend outpt workup for Parkinson's. Since home, hasn't been back up to normal energy. No congestion/fevers since hospital. Still having some residual dry coughing and occasionally coughs while eating but overall feeling better. Sent home with home health, PT/OT.     Wife reports he has fallen 6 times this month. Twice hit his face, other times fell backwards onto his bottom; losing balance getting dressed and falling forward when walking. Has been shuffling more while walking. No loss of consciousness after falls. Not complaining of headaches. Has PT today, has done once before. Patient is not interested in doing PT exercises or using cane/walker.     Wife also reported that he has been bleeding out of the sides of his mouth every morning for the past couple months. States will wake up with what looks like dried blood around mouth, washes off. Still eating and drinking fine, not complaining of pain in mouth. No nose bleeds. Hasn't been coughing until recent covid.     Reviewed medications in detail. Wife reports he is taking both Aricept and Namenda. They restarted when came home from hospital. Also stated that she has been giving him CBD gummies \"to help with aches and pains.\" Strongly recommended to discontinue CBD gummies given memory problems, and patient has not been complaining of pain. Wife expressed understanding.      Reviewed labs, imaging and notes in Care Everywhere from recent hospitalization.      Vitals:    01/29/25 1117   BP: 132/74   Site: Left Upper Arm   Position: Sitting   Cuff Size: Large Adult   Pulse: 85   Temp: 97.6 °F (36.4 °C)   TempSrc: Infrared   SpO2: 97%   Weight: 86.2 kg (190 lb)     Wt

## 2025-01-31 ENCOUNTER — TELEPHONE (OUTPATIENT)
Dept: CARDIOLOGY CLINIC | Age: 85
End: 2025-01-31

## 2025-01-31 NOTE — TELEPHONE ENCOUNTER
Per last OV, wife reported that patient was refusing all medications at home. We had a long discussion and he was not willing to listen and appeared to not understand. Wife feels he has dementia and was working with PCP on that. I do not think that he will take medications regardless based on that discussion in December.

## 2025-01-31 NOTE — TELEPHONE ENCOUNTER
Kat states pt's BP is 171/108, pt denies having any symptoms, states he is fine.    Kat states pt has Lisinopril, 10 mg, but it is old.  The newest is from March 2024.    Please advise.

## 2025-01-31 NOTE — TELEPHONE ENCOUNTER
Spoke with Kat and advised conversation per AHRN. Kat stated pt did take a few medication when she was there including baby aspirin and aricept. She will discuss with Pt's wife about where the conversation stands regarding medication adherence and let us know. She is working with the Pt regarding his cognition.

## 2025-02-03 ENCOUNTER — TELEPHONE (OUTPATIENT)
Dept: CARDIOLOGY CLINIC | Age: 85
End: 2025-02-03

## 2025-02-03 NOTE — TELEPHONE ENCOUNTER
[facility] states we could try a clonidine patch, IF the patient will wear it. He was refusing all medications and felt that there was nothing wrong with him. His wife attempts to give him his medications but he will not take them. He does not believe he has any medical conditions. He just needs to take his medications, otherwise I am not sure what else can be offered. If his wife thinks he will wear a patch, we can send a script.     Speech therapist called, but we probably need to d/w wife if she thinks he will wear one.

## 2025-02-03 NOTE — TELEPHONE ENCOUNTER
Yes, if he will take the lisinopril, she can give him that. It may take a week or so for his bp to normalize since he has been off of it.

## 2025-02-03 NOTE — TELEPHONE ENCOUNTER
Patients wife called back to report BP's of 120/66  and 122/66 with common Heart rate of 69.     Please advise

## 2025-02-03 NOTE — TELEPHONE ENCOUNTER
Kat called aydeeg Ronald BP read 157/106 today.    She notes that he did not complain of any symptoms and mentioned that he would be agreeable to BP medication.

## 2025-02-03 NOTE — TELEPHONE ENCOUNTER
I spoke with pt wife and relayed message per AHRN. Kimberly stated that she has been able to get pt to take his dementia meds and says she has Lisinopril 10 mg , wants to know if she should start that, because she has that on hand.

## 2025-02-07 ENCOUNTER — TELEPHONE (OUTPATIENT)
Dept: CARDIOLOGY CLINIC | Age: 85
End: 2025-02-07

## 2025-02-07 NOTE — TELEPHONE ENCOUNTER
Spoke with patient's wife and home care nurse.B/P readings are with out Lisinopril.    Home care nurse rechecked b/p 159/91    Home care nurse stated she would give him the lisinopril.

## 2025-02-07 NOTE — TELEPHONE ENCOUNTER
Is this with taking lisinopril? I know his wife was trying to get him to take it. Please let me know what meds were taken prior to these readings and I will d/w Dr Reza

## 2025-02-07 NOTE — TELEPHONE ENCOUNTER
Agree, yes he needs to take the lisinopril which usually controls it. His wife sometimes is unable to get him to agree to taking it so it will be higher. If he runs high while taking it regularly, then they should reach out for adjustments. Thanks

## 2025-02-07 NOTE — TELEPHONE ENCOUNTER
Kat called to report the Pt BP readings:    164/101     65      02/07    9:51am  154/98       88      02/07    9:30am    Please advise.  Thank you

## 2025-02-19 ENCOUNTER — TELEPHONE (OUTPATIENT)
Dept: FAMILY MEDICINE CLINIC | Age: 85
End: 2025-02-19

## 2025-02-19 NOTE — TELEPHONE ENCOUNTER
Patients wife called in stating that her  has had a cough as well as congestion for the past couple of days, she has been giving him cough drops as well as Zicam. Would like to know if there is anything else that she can give him or if something can be sent in without being seen. Patients wife said they were just here a few weeks ago but was advised that he may need to be seen. Patients wife still wants a message sent back for provider advice.     Call back- [phone]

## 2025-02-20 ENCOUNTER — OFFICE VISIT (OUTPATIENT)
Dept: FAMILY MEDICINE CLINIC | Age: 85
End: 2025-02-20

## 2025-02-20 VITALS
SYSTOLIC BLOOD PRESSURE: 98 MMHG | RESPIRATION RATE: 16 BRPM | TEMPERATURE: 97 F | HEART RATE: 98 BPM | OXYGEN SATURATION: 98 % | DIASTOLIC BLOOD PRESSURE: 66 MMHG

## 2025-02-20 DIAGNOSIS — R05.1 ACUTE COUGH: ICD-10-CM

## 2025-02-20 DIAGNOSIS — J10.1 INFLUENZA A: Primary | ICD-10-CM

## 2025-02-20 LAB
INFLUENZA A ANTIGEN, POC: POSITIVE
INFLUENZA B ANTIGEN, POC: NEGATIVE
LOT EXPIRE DATE: ABNORMAL
LOT KIT NUMBER: ABNORMAL
SARS-COV-2 RNA, POC: NEGATIVE
VALID INTERNAL CONTROL: ABNORMAL
VENDOR AND KIT NAME POC: ABNORMAL

## 2025-02-20 RX ORDER — OSELTAMIVIR PHOSPHATE 75 MG/1
75 CAPSULE ORAL 2 TIMES DAILY
Qty: 10 CAPSULE | Refills: 0 | Status: SHIPPED | OUTPATIENT
Start: 2025-02-20 | End: 2025-02-25

## 2025-02-20 ASSESSMENT — ENCOUNTER SYMPTOMS
NAUSEA: 0
VOMITING: 0
SHORTNESS OF BREATH: 0
DIARRHEA: 0
COUGH: 1

## 2025-02-20 NOTE — PROGRESS NOTES
Ronald Henry  : 1940  Encounter date: 2025    This is a 84 y.o. male who presents with  Chief Complaint   Patient presents with    Cough     History of present illness:    HPI   Presents to clinic today with concerns for    Allergies   Allergen Reactions    Pcn [Penicillins] Hives     Current Outpatient Medications   Medication Sig Dispense Refill    vitamin D (CHOLECALCIFEROL) 125 MCG (5000 UT) CAPS capsule Take 1 capsule by mouth daily      memantine ER (NAMENDA XR) 28 MG CP24 extended release capsule Take by mouth daily      donepezil (ARICEPT) 10 MG tablet Take 1 tablet by mouth nightly      aspirin EC 81 MG EC tablet Take 1 tablet by mouth daily 90 tablet 1     No current facility-administered medications for this visit.        Review of Systems    Past medical, surgical, family and social history were reviewed and updated with the patient.    Objective:    Pulse 98   Temp 97 °F (36.1 °C) (Infrared)   Resp 16   SpO2 98%         BP Readings from Last 3 Encounters:   25 132/74   24 128/70   24 130/86     Wt Readings from Last 3 Encounters:   25 86.2 kg (190 lb)   24 85.7 kg (189 lb)   24 86.5 kg (190 lb 12.8 oz)       Physical Exam    Assessment/Plan    There are no diagnoses linked to this encounter.     Ronald Henry was counseled regarding symptoms of current diagnosis, course and complications of disease if inadequately treated.  Discussed side effects of medications, diagnosis, treatment options, and prognosis along with risks, benefits, complications, and alternatives of treatment including labs, imaging and other studies/treatment targets and goals.  He verbalized understanding of instructions and counseling.    No follow-ups on file.  
132/74   12/13/24 128/70     Wt Readings from Last 3 Encounters:   01/29/25 86.2 kg (190 lb)   12/13/24 85.7 kg (189 lb)   11/27/24 86.5 kg (190 lb 12.8 oz)       Physical Exam  Constitutional:       General: He is not in acute distress.     Appearance: He is well-developed.   HENT:      Head: Normocephalic and atraumatic.   Cardiovascular:      Rate and Rhythm: Normal rate and regular rhythm.      Heart sounds: Normal heart sounds, S1 normal and S2 normal.   Pulmonary:      Effort: Pulmonary effort is normal. No respiratory distress.      Breath sounds: Normal breath sounds.      Comments: Bronchospastic cough.   Skin:     General: Skin is warm and dry.   Neurological:      Mental Status: He is alert and oriented to person, place, and time.   Psychiatric:         Thought Content: Thought content normal.         Judgment: Judgment normal.       Assessment/Plan    1. Influenza A  Initiate Tamiflu.  Discussed with wife ED vs home - patient not wanting to got to ED.  B/P/vitals in office okay today.  Discussed low threshold for ED if unable to eat/drink.  I did give him a cup of water in office which he finished.  Wife agreeable to take him with any change/worsening of symptoms.   - oseltamivir (TAMIFLU) 75 MG capsule; Take 1 capsule by mouth 2 times daily for 5 days  Dispense: 10 capsule; Refill: 0  - AMB POC SARS-COV-2 AND INFLUENZA A/B    2. Acute cough  - AMB POC SARS-COV-2 AND INFLUENZA A/B     Ronald Henry was counseled regarding symptoms of current diagnosis, course and complications of disease if inadequately treated.  Discussed side effects of medications, diagnosis, treatment options, and prognosis along with risks, benefits, complications, and alternatives of treatment including labs, imaging and other studies/treatment targets and goals.  He verbalized understanding of instructions and counseling.    Return if symptoms worsen or fail to improve.    Medical decision making of low complexity.

## 2025-02-24 ENCOUNTER — TELEPHONE (OUTPATIENT)
Dept: FAMILY MEDICINE CLINIC | Age: 85
End: 2025-02-24

## 2025-02-24 RX ORDER — BENZONATATE 100 MG/1
100 CAPSULE ORAL 3 TIMES DAILY PRN
Qty: 30 CAPSULE | Refills: 0 | Status: SHIPPED | OUTPATIENT
Start: 2025-02-24 | End: 2025-03-06

## 2025-02-24 NOTE — TELEPHONE ENCOUNTER
We can send tessalon to the pharmacy to try but that's really all  She could try giving him honey as well

## 2025-02-24 NOTE — TELEPHONE ENCOUNTER
Patients wife called stating that she would like recommendations on medications to help with cough and congestion in patient. She requested Zpack but patient was told abx wont help with flu since it is viral. Patients wife stated understanding but would like other recommendations. She has been giving patient Mucinex, Robitussin, and Tamiflu. States the patient does not get up from chair but once a day.     Please advise

## 2025-02-28 ENCOUNTER — TELEPHONE (OUTPATIENT)
Dept: FAMILY MEDICINE CLINIC | Age: 85
End: 2025-02-28

## 2025-02-28 NOTE — TELEPHONE ENCOUNTER
Patients spouse calling stating they have had Flu A for over a week now and the patient is barely eating. Spouse stating that patient is only eating two bites of food as well as only using restroom one time a day. Spouse would like to speak with provider on what to do.    Please advise.

## 2025-04-23 ENCOUNTER — OFFICE VISIT (OUTPATIENT)
Dept: NEUROLOGY | Age: 85
End: 2025-04-23
Payer: MEDICARE

## 2025-04-23 VITALS
SYSTOLIC BLOOD PRESSURE: 121 MMHG | WEIGHT: 188 LBS | HEART RATE: 49 BPM | HEIGHT: 66 IN | BODY MASS INDEX: 30.22 KG/M2 | DIASTOLIC BLOOD PRESSURE: 65 MMHG

## 2025-04-23 DIAGNOSIS — F02.80 DEMENTIA DUE TO ALZHEIMER'S DISEASE (HCC): ICD-10-CM

## 2025-04-23 DIAGNOSIS — G30.9 DEMENTIA DUE TO ALZHEIMER'S DISEASE (HCC): ICD-10-CM

## 2025-04-23 DIAGNOSIS — F02.80 DEMENTIA DUE TO ALZHEIMER'S DISEASE (HCC): Primary | ICD-10-CM

## 2025-04-23 DIAGNOSIS — G30.9 DEMENTIA DUE TO ALZHEIMER'S DISEASE (HCC): Primary | ICD-10-CM

## 2025-04-23 DIAGNOSIS — I10 HTN (HYPERTENSION), BENIGN: ICD-10-CM

## 2025-04-23 DIAGNOSIS — R27.0 ATAXIA: ICD-10-CM

## 2025-04-23 LAB — VIT B12 SERPL-MCNC: 358 PG/ML (ref 211–911)

## 2025-04-23 PROCEDURE — 1036F TOBACCO NON-USER: CPT | Performed by: PSYCHIATRY & NEUROLOGY

## 2025-04-23 PROCEDURE — 99204 OFFICE O/P NEW MOD 45 MIN: CPT | Performed by: PSYCHIATRY & NEUROLOGY

## 2025-04-23 PROCEDURE — G8417 CALC BMI ABV UP PARAM F/U: HCPCS | Performed by: PSYCHIATRY & NEUROLOGY

## 2025-04-23 PROCEDURE — 1123F ACP DISCUSS/DSCN MKR DOCD: CPT | Performed by: PSYCHIATRY & NEUROLOGY

## 2025-04-23 PROCEDURE — 1160F RVW MEDS BY RX/DR IN RCRD: CPT | Performed by: PSYCHIATRY & NEUROLOGY

## 2025-04-23 PROCEDURE — 3078F DIAST BP <80 MM HG: CPT | Performed by: PSYCHIATRY & NEUROLOGY

## 2025-04-23 PROCEDURE — G8427 DOCREV CUR MEDS BY ELIG CLIN: HCPCS | Performed by: PSYCHIATRY & NEUROLOGY

## 2025-04-23 PROCEDURE — 1159F MED LIST DOCD IN RCRD: CPT | Performed by: PSYCHIATRY & NEUROLOGY

## 2025-04-23 PROCEDURE — 3074F SYST BP LT 130 MM HG: CPT | Performed by: PSYCHIATRY & NEUROLOGY

## 2025-04-23 RX ORDER — LISINOPRIL 10 MG/1
10 TABLET ORAL DAILY
COMMUNITY

## 2025-04-23 NOTE — PROGRESS NOTES
The patient is a 84 y.o. years old male who  was referred by Ila Cho MD for consultation regarding memory loss.     HPI:    Patient was diagnosed with dementia 2 to 3 years ago.  Degree is moderate.  Frequency is daily.  Description: Difficulties with daily activity, short-term memory and repeating himself.  He is not in line but denies any behavior changes.  No significant or severe psychosis or major insomnia.  He is not very active physically and spent most of the time at home.  No hallucination at night.  No parasomnia.  Other associated symptoms include some tremors and rigidity.  No falling.  Quit working out.  He does not drive.  He is on Aricept 10 mg daily and Namenda 28 mg daily.  Takes lisinopril for hypertension.  No severe depression, trauma or history of strokes.  No recent MRI or B12.  No bladder or bowel issues.  Other review of system was unremarkable.      ROS : A 10-14 system review of constitutional, cardiovascular, respiratory, GI, eyes, , ENT, musculoskeletal, endocrine, skin, SHEENT, genitourinary, psychiatric and neurologic systems was obtained and updated today and is unremarkable except as mentioned in my HPI        Exam:   Constitutional:   Vitals:    04/23/25 1146   BP: 121/65   Pulse: (!) 49   Weight: 85.3 kg (188 lb)   Height: 1.676 m (5' 5.98\")       General appearance:  Normal development and appear in no acute distress.   Mental Status: MMSE 27, poor insight  Oriented to person, place, problem, and time.    Memory: Good immediate recall.  Intact remote memory  Normal attention span and concentration.  Language: intact naming, repeating and fluency   Good fund of Knowledge. Aware of current events and vocabulary   Cranial Nerves: Pupil round regular and reactive, extraocular muscles are intact, face is symmetric, no ophthalmoplegia, tongue is midline and rest of cranial nerves are normal.     Musculoskeletal: no focal weakness in UE or LL.   Reflexes: Symmetric 2+ in both

## 2025-04-23 NOTE — PATIENT INSTRUCTIONS
YOU MUST CONFIRM YOUR APPOINTMENT 1 DAY PRIOR OR IT WILL BE CANCELLED!!   Our office will call you 3 times the day prior to your appointment in an attempt to confirm.  Please return our call ASAP or confirm your appt through [facility] [employer] no later than 3 pm the day before your appointment.  If we do not hear back from you by 3 pm to confirm, your appointment will be cancelled & someone will be added into that slot from our wait list.

## 2025-04-24 ENCOUNTER — RESULTS FOLLOW-UP (OUTPATIENT)
Dept: NEUROLOGY | Age: 85
End: 2025-04-24

## 2025-04-25 NOTE — RESULT ENCOUNTER NOTE
Spoke to wife and advised Slightly low borderline B12   Take oral B12 supplement (OTC 1,000iu daily)  Wife states understanding

## 2025-05-08 ENCOUNTER — HOSPITAL ENCOUNTER (OUTPATIENT)
Dept: MRI IMAGING | Age: 85
Discharge: HOME | End: 2025-05-08
Attending: PSYCHIATRY & NEUROLOGY
Payer: MEDICARE

## 2025-05-08 DIAGNOSIS — F02.80 DEMENTIA DUE TO ALZHEIMER'S DISEASE (HCC): ICD-10-CM

## 2025-05-08 DIAGNOSIS — G30.9 DEMENTIA DUE TO ALZHEIMER'S DISEASE (HCC): ICD-10-CM

## 2025-05-08 PROCEDURE — 70551 MRI BRAIN STEM W/O DYE: CPT

## 2025-05-15 NOTE — RESULT ENCOUNTER NOTE
Spoke with pts partner Kimberly Bianchi. I let her know that Dr. Bashir mentioned he has chronic small vessel disease (Informed her that can be common as we get older the vessels start to shrink) and that the MRI other wise looked normal. She voiced understanding.  Stable; discussed signs and symptoms concerning for worsening condition requiring assessment/treatment by health care provider    Take medication as directed    Follow-up in 6 months   121

## 2025-05-27 NOTE — PROGRESS NOTES
obese, well hydrated, well developed.  LUNG:clear to auscultation bilaterally with normal respiratory effort  CV: Normal heart sounds, regular rate and rhythm without murmurs  EXTREMETY: no loss of hair, no edema, normal pedal pulses bilaterally            ASSESSMENT AND PLAN:       Ronald \"Flash\" was seen today for hypertension.    Diagnoses and all orders for this visit:    Essential hypertension  -Stable, continue current medications.  Reprinted labs ordered last visit (cmp, lipid, cbc) and requested pt get done      Moderate late onset Alzheimer's dementia without behavioral disturbance, psychotic disturbance, mood disturbance, or anxiety (HCC)  -Stable, continue current medications.    Other orders  -     donepezil (ARICEPT) 10 MG tablet; Take 1 tablet by mouth nightly  -     lisinopril (PRINIVIL;ZESTRIL) 10 MG tablet; Take 1 tablet by mouth daily  -     memantine ER (NAMENDA XR) 28 MG CP24 extended release capsule; Take 1 capsule by mouth daily            Return in about 6 months (around 11/28/2025) for AWV, 30 min.         Portions of Note per  Nia Beavers CMA AAMA with corrections and edits per Ila Cho MD.  I agree with entirety of note and was present and performed history and physical.  I also confirm that the note above accurately reflects all work, treatment, procedures, and medical decision making performed by me, Ila Cho MD

## 2025-05-28 ENCOUNTER — OFFICE VISIT (OUTPATIENT)
Dept: FAMILY MEDICINE CLINIC | Age: 85
End: 2025-05-28
Payer: MEDICARE

## 2025-05-28 VITALS
OXYGEN SATURATION: 98 % | SYSTOLIC BLOOD PRESSURE: 108 MMHG | HEART RATE: 96 BPM | DIASTOLIC BLOOD PRESSURE: 70 MMHG | WEIGHT: 187.8 LBS | TEMPERATURE: 97.3 F | BODY MASS INDEX: 30.33 KG/M2

## 2025-05-28 DIAGNOSIS — F02.B0 MODERATE LATE ONSET ALZHEIMER'S DEMENTIA WITHOUT BEHAVIORAL DISTURBANCE, PSYCHOTIC DISTURBANCE, MOOD DISTURBANCE, OR ANXIETY (HCC): ICD-10-CM

## 2025-05-28 DIAGNOSIS — I10 ESSENTIAL HYPERTENSION: Primary | Chronic | ICD-10-CM

## 2025-05-28 DIAGNOSIS — G30.1 MODERATE LATE ONSET ALZHEIMER'S DEMENTIA WITHOUT BEHAVIORAL DISTURBANCE, PSYCHOTIC DISTURBANCE, MOOD DISTURBANCE, OR ANXIETY (HCC): ICD-10-CM

## 2025-05-28 PROCEDURE — 3074F SYST BP LT 130 MM HG: CPT | Performed by: FAMILY MEDICINE

## 2025-05-28 PROCEDURE — 1123F ACP DISCUSS/DSCN MKR DOCD: CPT | Performed by: FAMILY MEDICINE

## 2025-05-28 PROCEDURE — 99214 OFFICE O/P EST MOD 30 MIN: CPT | Performed by: FAMILY MEDICINE

## 2025-05-28 PROCEDURE — 1036F TOBACCO NON-USER: CPT | Performed by: FAMILY MEDICINE

## 2025-05-28 PROCEDURE — G8417 CALC BMI ABV UP PARAM F/U: HCPCS | Performed by: FAMILY MEDICINE

## 2025-05-28 PROCEDURE — 1159F MED LIST DOCD IN RCRD: CPT | Performed by: FAMILY MEDICINE

## 2025-05-28 PROCEDURE — 3078F DIAST BP <80 MM HG: CPT | Performed by: FAMILY MEDICINE

## 2025-05-28 PROCEDURE — G8427 DOCREV CUR MEDS BY ELIG CLIN: HCPCS | Performed by: FAMILY MEDICINE

## 2025-05-28 PROCEDURE — G2211 COMPLEX E/M VISIT ADD ON: HCPCS | Performed by: FAMILY MEDICINE

## 2025-05-28 RX ORDER — LISINOPRIL 10 MG/1
10 TABLET ORAL DAILY
Qty: 90 TABLET | Refills: 3 | Status: SHIPPED | OUTPATIENT
Start: 2025-05-28

## 2025-05-28 RX ORDER — MEMANTINE HYDROCHLORIDE 28 MG/1
28 CAPSULE, EXTENDED RELEASE ORAL DAILY
Qty: 90 CAPSULE | Refills: 3 | Status: SHIPPED | OUTPATIENT
Start: 2025-05-28

## 2025-05-28 RX ORDER — DONEPEZIL HYDROCHLORIDE 10 MG/1
10 TABLET, FILM COATED ORAL NIGHTLY
Qty: 90 TABLET | Refills: 3 | Status: SHIPPED | OUTPATIENT
Start: 2025-05-28

## 2025-05-28 ASSESSMENT — PATIENT HEALTH QUESTIONNAIRE - PHQ9
SUM OF ALL RESPONSES TO PHQ QUESTIONS 1-9: 0
SUM OF ALL RESPONSES TO PHQ QUESTIONS 1-9: 0
2. FEELING DOWN, DEPRESSED OR HOPELESS: NOT AT ALL
SUM OF ALL RESPONSES TO PHQ QUESTIONS 1-9: 0
1. LITTLE INTEREST OR PLEASURE IN DOING THINGS: NOT AT ALL
SUM OF ALL RESPONSES TO PHQ QUESTIONS 1-9: 0

## 2025-05-29 DIAGNOSIS — I10 ESSENTIAL HYPERTENSION: Chronic | ICD-10-CM

## 2025-05-29 DIAGNOSIS — N18.31 STAGE 3A CHRONIC KIDNEY DISEASE (HCC): ICD-10-CM

## 2025-05-29 LAB
ALBUMIN SERPL-MCNC: 3.9 G/DL (ref 3.4–5)
ALBUMIN/GLOB SERPL: 1.3 {RATIO} (ref 1.1–2.2)
ALP SERPL-CCNC: 126 U/L (ref 40–129)
ALT SERPL-CCNC: 18 U/L (ref 10–40)
ANION GAP SERPL CALCULATED.3IONS-SCNC: 11 MMOL/L (ref 3–16)
AST SERPL-CCNC: 26 U/L (ref 15–37)
BILIRUB SERPL-MCNC: 0.7 MG/DL (ref 0–1)
BUN SERPL-MCNC: 20 MG/DL (ref 7–20)
CALCIUM SERPL-MCNC: 9.3 MG/DL (ref 8.3–10.6)
CHLORIDE SERPL-SCNC: 103 MMOL/L (ref 99–110)
CHOLEST SERPL-MCNC: 230 MG/DL (ref 0–199)
CO2 SERPL-SCNC: 28 MMOL/L (ref 21–32)
CREAT SERPL-MCNC: 1.3 MG/DL (ref 0.8–1.3)
DEPRECATED RDW RBC AUTO: 14.1 % (ref 12.4–15.4)
GFR SERPLBLD CREATININE-BSD FMLA CKD-EPI: 54 ML/MIN/{1.73_M2}
GLUCOSE SERPL-MCNC: 100 MG/DL (ref 70–99)
HCT VFR BLD AUTO: 46.2 % (ref 40.5–52.5)
HDLC SERPL-MCNC: 43 MG/DL (ref 40–60)
HGB BLD-MCNC: 15.3 G/DL (ref 13.5–17.5)
LDLC SERPL CALC-MCNC: 162 MG/DL
MCH RBC QN AUTO: 31.1 PG (ref 26–34)
MCHC RBC AUTO-ENTMCNC: 33.2 G/DL (ref 31–36)
MCV RBC AUTO: 93.8 FL (ref 80–100)
PLATELET # BLD AUTO: 314 K/UL (ref 135–450)
PMV BLD AUTO: 7.9 FL (ref 5–10.5)
POTASSIUM SERPL-SCNC: 4.4 MMOL/L (ref 3.5–5.1)
PROT SERPL-MCNC: 6.9 G/DL (ref 6.4–8.2)
RBC # BLD AUTO: 4.92 M/UL (ref 4.2–5.9)
SODIUM SERPL-SCNC: 142 MMOL/L (ref 136–145)
TRIGL SERPL-MCNC: 125 MG/DL (ref 0–150)
VLDLC SERPL CALC-MCNC: 25 MG/DL
WBC # BLD AUTO: 7.5 K/UL (ref 4–11)

## 2025-05-30 ENCOUNTER — RESULTS FOLLOW-UP (OUTPATIENT)
Dept: FAMILY MEDICINE CLINIC | Age: 85
End: 2025-05-30

## 2025-07-29 ENCOUNTER — OFFICE VISIT (OUTPATIENT)
Dept: FAMILY MEDICINE CLINIC | Age: 85
End: 2025-07-29

## 2025-07-29 VITALS — BODY MASS INDEX: 28.91 KG/M2 | WEIGHT: 179 LBS

## 2025-07-29 DIAGNOSIS — H61.23 BILATERAL IMPACTED CERUMEN: Primary | ICD-10-CM

## 2025-07-29 NOTE — PROGRESS NOTES
Ear Irrigation Procedure Note    Ear irrigation procedure was performed using a WaterPik / Elephant ear to the both ear(s) for cerumen impaction.  The remaining wax and debris was removed with curette  instrumentation.  The procedure was successful. The patient tolerated the procedure without complications.

## 2025-08-06 ENCOUNTER — OFFICE VISIT (OUTPATIENT)
Dept: FAMILY MEDICINE CLINIC | Age: 85
End: 2025-08-06
Payer: MEDICARE

## 2025-08-06 VITALS
OXYGEN SATURATION: 98 % | DIASTOLIC BLOOD PRESSURE: 66 MMHG | BODY MASS INDEX: 28.97 KG/M2 | WEIGHT: 179.4 LBS | HEART RATE: 67 BPM | SYSTOLIC BLOOD PRESSURE: 112 MMHG | TEMPERATURE: 97.7 F

## 2025-08-06 DIAGNOSIS — G30.1 MODERATE LATE ONSET ALZHEIMER'S DEMENTIA WITH AGITATION (HCC): Primary | ICD-10-CM

## 2025-08-06 DIAGNOSIS — F02.B11 MODERATE LATE ONSET ALZHEIMER'S DEMENTIA WITH AGITATION (HCC): Primary | ICD-10-CM

## 2025-08-06 DIAGNOSIS — R63.4 WEIGHT LOSS: ICD-10-CM

## 2025-08-06 PROCEDURE — 1159F MED LIST DOCD IN RCRD: CPT | Performed by: FAMILY MEDICINE

## 2025-08-06 PROCEDURE — G8427 DOCREV CUR MEDS BY ELIG CLIN: HCPCS | Performed by: FAMILY MEDICINE

## 2025-08-06 PROCEDURE — 3078F DIAST BP <80 MM HG: CPT | Performed by: FAMILY MEDICINE

## 2025-08-06 PROCEDURE — 99214 OFFICE O/P EST MOD 30 MIN: CPT | Performed by: FAMILY MEDICINE

## 2025-08-06 PROCEDURE — 1123F ACP DISCUSS/DSCN MKR DOCD: CPT | Performed by: FAMILY MEDICINE

## 2025-08-06 PROCEDURE — G8417 CALC BMI ABV UP PARAM F/U: HCPCS | Performed by: FAMILY MEDICINE

## 2025-08-06 PROCEDURE — 3074F SYST BP LT 130 MM HG: CPT | Performed by: FAMILY MEDICINE

## 2025-08-06 PROCEDURE — 1036F TOBACCO NON-USER: CPT | Performed by: FAMILY MEDICINE

## 2025-08-06 PROCEDURE — G2211 COMPLEX E/M VISIT ADD ON: HCPCS | Performed by: FAMILY MEDICINE
